# Patient Record
Sex: FEMALE | Race: ASIAN | NOT HISPANIC OR LATINO
[De-identification: names, ages, dates, MRNs, and addresses within clinical notes are randomized per-mention and may not be internally consistent; named-entity substitution may affect disease eponyms.]

---

## 2017-08-01 ENCOUNTER — NON-APPOINTMENT (OUTPATIENT)
Age: 67
End: 2017-08-01

## 2017-08-01 ENCOUNTER — APPOINTMENT (OUTPATIENT)
Dept: INTERNAL MEDICINE | Facility: CLINIC | Age: 67
End: 2017-08-01
Payer: MEDICARE

## 2017-08-01 VITALS
SYSTOLIC BLOOD PRESSURE: 146 MMHG | HEIGHT: 61 IN | OXYGEN SATURATION: 98 % | DIASTOLIC BLOOD PRESSURE: 80 MMHG | HEART RATE: 88 BPM | BODY MASS INDEX: 22.66 KG/M2 | WEIGHT: 120 LBS

## 2017-08-01 DIAGNOSIS — Z86.39 PERSONAL HISTORY OF OTHER ENDOCRINE, NUTRITIONAL AND METABOLIC DISEASE: ICD-10-CM

## 2017-08-01 LAB
BASOPHILS # BLD AUTO: 0.03 K/UL
BASOPHILS NFR BLD AUTO: 0.5 %
EOSINOPHIL # BLD AUTO: 0.07 K/UL
EOSINOPHIL NFR BLD AUTO: 1.1 %
HCT VFR BLD CALC: 42.8 %
HGB BLD-MCNC: 13.6 G/DL
IMM GRANULOCYTES NFR BLD AUTO: 0.2 %
LYMPHOCYTES # BLD AUTO: 1.11 K/UL
LYMPHOCYTES NFR BLD AUTO: 17.3 %
MAN DIFF?: NORMAL
MCHC RBC-ENTMCNC: 28.9 PG
MCHC RBC-ENTMCNC: 31.8 GM/DL
MCV RBC AUTO: 90.9 FL
MONOCYTES # BLD AUTO: 0.33 K/UL
MONOCYTES NFR BLD AUTO: 5.2 %
NEUTROPHILS # BLD AUTO: 4.85 K/UL
NEUTROPHILS NFR BLD AUTO: 75.7 %
PLATELET # BLD AUTO: 237 K/UL
RBC # BLD: 4.71 M/UL
RBC # FLD: 15.2 %
WBC # FLD AUTO: 6.4 K/UL

## 2017-08-01 PROCEDURE — 93000 ELECTROCARDIOGRAM COMPLETE: CPT

## 2017-08-01 PROCEDURE — 99397 PER PM REEVAL EST PAT 65+ YR: CPT | Mod: 25

## 2017-08-02 ENCOUNTER — RX RENEWAL (OUTPATIENT)
Age: 67
End: 2017-08-02

## 2017-08-02 VITALS — SYSTOLIC BLOOD PRESSURE: 140 MMHG | DIASTOLIC BLOOD PRESSURE: 80 MMHG | HEART RATE: 86 BPM

## 2017-08-08 ENCOUNTER — FORM ENCOUNTER (OUTPATIENT)
Age: 67
End: 2017-08-08

## 2017-08-08 LAB
25(OH)D3 SERPL-MCNC: 26.4 NG/ML
ALBUMIN SERPL ELPH-MCNC: 4.4 G/DL
ALP BLD-CCNC: 65 U/L
ALT SERPL-CCNC: 14 U/L
ANION GAP SERPL CALC-SCNC: 15 MMOL/L
AST SERPL-CCNC: 21 U/L
BILIRUB SERPL-MCNC: 0.5 MG/DL
BUN SERPL-MCNC: 16 MG/DL
CALCIUM SERPL-MCNC: 9.6 MG/DL
CHLORIDE SERPL-SCNC: 102 MMOL/L
CHOLEST SERPL-MCNC: 168 MG/DL
CHOLEST/HDLC SERPL: 2.9 RATIO
CO2 SERPL-SCNC: 24 MMOL/L
CREAT SERPL-MCNC: 0.75 MG/DL
GLUCOSE SERPL-MCNC: 98 MG/DL
HBA1C MFR BLD HPLC: 6 %
HDLC SERPL-MCNC: 57 MG/DL
LDLC SERPL CALC-MCNC: 89 MG/DL
POTASSIUM SERPL-SCNC: 4.2 MMOL/L
PROT SERPL-MCNC: 8.4 G/DL
SODIUM SERPL-SCNC: 141 MMOL/L
T4 FREE SERPL-MCNC: 1.1 NG/DL
TRIGL SERPL-MCNC: 109 MG/DL
TSH SERPL-ACNC: 5.05 UIU/ML

## 2017-08-09 ENCOUNTER — APPOINTMENT (OUTPATIENT)
Dept: MAMMOGRAPHY | Facility: CLINIC | Age: 67
End: 2017-08-09

## 2017-08-09 ENCOUNTER — OUTPATIENT (OUTPATIENT)
Dept: OUTPATIENT SERVICES | Facility: HOSPITAL | Age: 67
LOS: 1 days | End: 2017-08-09
Payer: MEDICARE

## 2017-08-09 DIAGNOSIS — Z00.8 ENCOUNTER FOR OTHER GENERAL EXAMINATION: ICD-10-CM

## 2017-08-09 PROCEDURE — 77067 SCR MAMMO BI INCL CAD: CPT

## 2017-08-09 PROCEDURE — 77063 BREAST TOMOSYNTHESIS BI: CPT

## 2017-08-09 PROCEDURE — 77063 BREAST TOMOSYNTHESIS BI: CPT | Mod: 26

## 2017-08-09 PROCEDURE — G0202: CPT | Mod: 26

## 2017-08-14 ENCOUNTER — APPOINTMENT (OUTPATIENT)
Dept: CARDIOLOGY | Facility: CLINIC | Age: 67
End: 2017-08-14
Payer: MEDICARE

## 2017-08-14 ENCOUNTER — NON-APPOINTMENT (OUTPATIENT)
Age: 67
End: 2017-08-14

## 2017-08-14 VITALS
BODY MASS INDEX: 21.9 KG/M2 | HEIGHT: 61 IN | WEIGHT: 116 LBS | HEART RATE: 86 BPM | DIASTOLIC BLOOD PRESSURE: 82 MMHG | SYSTOLIC BLOOD PRESSURE: 142 MMHG

## 2017-08-14 VITALS — DIASTOLIC BLOOD PRESSURE: 84 MMHG | SYSTOLIC BLOOD PRESSURE: 136 MMHG

## 2017-08-14 PROCEDURE — 99204 OFFICE O/P NEW MOD 45 MIN: CPT

## 2017-08-14 PROCEDURE — 93000 ELECTROCARDIOGRAM COMPLETE: CPT

## 2017-08-29 ENCOUNTER — APPOINTMENT (OUTPATIENT)
Dept: CARDIOLOGY | Facility: CLINIC | Age: 67
End: 2017-08-29
Payer: MEDICARE

## 2017-08-29 DIAGNOSIS — R94.31 ABNORMAL ELECTROCARDIOGRAM [ECG] [EKG]: ICD-10-CM

## 2017-08-29 DIAGNOSIS — R00.2 PALPITATIONS: ICD-10-CM

## 2017-08-29 PROCEDURE — 93015 CV STRESS TEST SUPVJ I&R: CPT

## 2017-08-29 PROCEDURE — 93306 TTE W/DOPPLER COMPLETE: CPT

## 2017-08-30 ENCOUNTER — FORM ENCOUNTER (OUTPATIENT)
Age: 67
End: 2017-08-30

## 2017-08-30 PROBLEM — R94.31 ABNORMAL ECG: Status: ACTIVE | Noted: 2017-08-16

## 2017-08-31 ENCOUNTER — APPOINTMENT (OUTPATIENT)
Dept: ULTRASOUND IMAGING | Facility: CLINIC | Age: 67
End: 2017-08-31
Payer: MEDICARE

## 2017-08-31 ENCOUNTER — OUTPATIENT (OUTPATIENT)
Dept: OUTPATIENT SERVICES | Facility: HOSPITAL | Age: 67
LOS: 1 days | End: 2017-08-31
Payer: MEDICARE

## 2017-08-31 DIAGNOSIS — Z00.8 ENCOUNTER FOR OTHER GENERAL EXAMINATION: ICD-10-CM

## 2017-08-31 PROCEDURE — 76642 ULTRASOUND BREAST LIMITED: CPT

## 2017-08-31 PROCEDURE — 76642 ULTRASOUND BREAST LIMITED: CPT | Mod: 26,RT

## 2017-11-01 ENCOUNTER — APPOINTMENT (OUTPATIENT)
Dept: INTERNAL MEDICINE | Facility: CLINIC | Age: 67
End: 2017-11-01
Payer: MEDICARE

## 2017-11-01 VITALS
HEIGHT: 61 IN | BODY MASS INDEX: 22.28 KG/M2 | HEART RATE: 104 BPM | DIASTOLIC BLOOD PRESSURE: 80 MMHG | SYSTOLIC BLOOD PRESSURE: 134 MMHG | WEIGHT: 118 LBS | OXYGEN SATURATION: 98 %

## 2017-11-01 PROCEDURE — 99213 OFFICE O/P EST LOW 20 MIN: CPT | Mod: 25

## 2017-11-01 PROCEDURE — 90662 IIV NO PRSV INCREASED AG IM: CPT

## 2017-11-01 PROCEDURE — G0008: CPT

## 2018-05-01 ENCOUNTER — FORM ENCOUNTER (OUTPATIENT)
Age: 68
End: 2018-05-01

## 2018-05-02 ENCOUNTER — APPOINTMENT (OUTPATIENT)
Dept: INTERNAL MEDICINE | Facility: CLINIC | Age: 68
End: 2018-05-02
Payer: MEDICARE

## 2018-05-02 ENCOUNTER — OUTPATIENT (OUTPATIENT)
Dept: OUTPATIENT SERVICES | Facility: HOSPITAL | Age: 68
LOS: 1 days | End: 2018-05-02
Payer: MEDICARE

## 2018-05-02 ENCOUNTER — APPOINTMENT (OUTPATIENT)
Dept: RADIOLOGY | Facility: CLINIC | Age: 68
End: 2018-05-02
Payer: MEDICARE

## 2018-05-02 VITALS
HEIGHT: 61 IN | DIASTOLIC BLOOD PRESSURE: 80 MMHG | SYSTOLIC BLOOD PRESSURE: 150 MMHG | BODY MASS INDEX: 22.66 KG/M2 | OXYGEN SATURATION: 96 % | WEIGHT: 120 LBS | HEART RATE: 92 BPM

## 2018-05-02 DIAGNOSIS — M54.5 LOW BACK PAIN: ICD-10-CM

## 2018-05-02 PROCEDURE — 72100 X-RAY EXAM L-S SPINE 2/3 VWS: CPT | Mod: 26

## 2018-05-02 PROCEDURE — 99213 OFFICE O/P EST LOW 20 MIN: CPT

## 2018-05-02 PROCEDURE — 72100 X-RAY EXAM L-S SPINE 2/3 VWS: CPT

## 2018-11-06 ENCOUNTER — APPOINTMENT (OUTPATIENT)
Dept: INTERNAL MEDICINE | Facility: CLINIC | Age: 68
End: 2018-11-06
Payer: MEDICARE

## 2018-11-06 VITALS
DIASTOLIC BLOOD PRESSURE: 80 MMHG | HEIGHT: 61 IN | OXYGEN SATURATION: 98 % | HEART RATE: 83 BPM | BODY MASS INDEX: 22.66 KG/M2 | WEIGHT: 120 LBS | SYSTOLIC BLOOD PRESSURE: 140 MMHG

## 2018-11-06 DIAGNOSIS — L30.9 DERMATITIS, UNSPECIFIED: ICD-10-CM

## 2018-11-06 DIAGNOSIS — N64.4 MASTODYNIA: ICD-10-CM

## 2018-11-06 PROCEDURE — 90662 IIV NO PRSV INCREASED AG IM: CPT

## 2018-11-06 PROCEDURE — G0439: CPT | Mod: 25

## 2018-11-06 PROCEDURE — G0008: CPT

## 2018-11-06 RX ORDER — ZOSTER VACCINE RECOMBINANT, ADJUVANTED 50 MCG/0.5
50 KIT INTRAMUSCULAR
Qty: 1 | Refills: 1 | Status: ACTIVE | COMMUNITY
Start: 2018-11-06 | End: 1900-01-01

## 2018-11-06 RX ORDER — ZOSTER VACCINE LIVE 19400 [PFU]/.65ML
19400 INJECTION, POWDER, LYOPHILIZED, FOR SUSPENSION SUBCUTANEOUS
Qty: 1 | Refills: 0 | Status: DISCONTINUED | COMMUNITY
Start: 2017-08-01 | End: 2018-11-06

## 2018-11-07 ENCOUNTER — TRANSCRIPTION ENCOUNTER (OUTPATIENT)
Age: 68
End: 2018-11-07

## 2018-11-07 NOTE — HEALTH RISK ASSESSMENT
[No falls in past year] : Patient reported no falls in the past year [Patient reported mammogram was normal] : Patient reported mammogram was normal [Patient reported PAP Smear was normal] : Patient reported PAP Smear was normal [Patient reported bone density results were normal] : Patient reported bone density results were normal [Patient reported colonoscopy was normal] : Patient reported colonoscopy was normal [HIV Test offered] : HIV Test offered [Hepatitis C test declined] : Hepatitis C test declined [None] : None [With Significant Other] : lives with significant other [Retired] : retired [] :  [# Of Children ___] : has [unfilled] children [Feels Safe at Home] : Feels safe at home [Fully functional (bathing, dressing, toileting, transferring, walking, feeding)] : Fully functional (bathing, dressing, toileting, transferring, walking, feeding) [Fully functional (using the telephone, shopping, preparing meals, housekeeping, doing laundry, using] : Fully functional and needs no help or supervision to perform IADLs (using the telephone, shopping, preparing meals, housekeeping, doing laundry, using transportation, managing medications and managing finances) [Smoke Detector] : smoke detector [Carbon Monoxide Detector] : carbon monoxide detector [Safety elements used in home] : safety elements used in home [Seat Belt] :  uses seat belt [Sunscreen] : uses sunscreen [Discussed at today's visit] : Advance Directives Discussed at today's visit [Name: ___] : Health Care Proxy's Name: [unfilled]  [Relationship: ___] : Relationship: [unfilled] [Very Good] : ~his/her~  mood as very good [] : No [0] : 2) Feeling down, depressed, or hopeless: Not at all (0) [Change in mental status noted] : No change in mental status noted [Language] : denies difficulty with language [Sexually Active] : not sexually active [High Risk Behavior] : no high risk behavior [Reports changes in hearing] : Reports no changes in hearing [Reports changes in vision] : Reports no changes in vision [Reports changes in dental health] : Reports no changes in dental health [Guns at Home] : no guns at home [Travel to Developing Areas] : does not  travel to developing areas [TB Exposure] : is not being exposed to tuberculosis [MammogramDate] : 8/17 [PapSmearDate] : 5/15 [BoneDensityDate] : 3/15 [ColonoscopyDate] : 3/16 [ColonoscopyComments] : 5 yrs [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

## 2018-11-07 NOTE — ASSESSMENT
[FreeTextEntry1] : Pt is doing well\par check labs\par Flu shot given\par Mammo/US and BMD due\par send in flonase and triamcin crm

## 2018-11-07 NOTE — HISTORY OF PRESENT ILLNESS
[FreeTextEntry1] : cpe [de-identified] : 66 yo woman for CPE\par HLD, preDM, hypothy s/p MARTELL no replacemt; eczema, low back pain,\par  o.penia, allergic rhitnits, breast pain s/p bx 20 yrs ago.\par \par Pt hasn't been taking her statin, wants to see lipids off them.\par c/o dry rash behind ears, lower arms. has been told eczmea by derm in past.\par

## 2018-11-12 LAB
ALBUMIN SERPL ELPH-MCNC: 4.6 G/DL
ALP BLD-CCNC: 81 U/L
ALT SERPL-CCNC: 16 U/L
ANION GAP SERPL CALC-SCNC: 13 MMOL/L
AST SERPL-CCNC: 19 U/L
BASOPHILS # BLD AUTO: 0.04 K/UL
BASOPHILS NFR BLD AUTO: 0.8 %
BILIRUB SERPL-MCNC: 0.4 MG/DL
BUN SERPL-MCNC: 16 MG/DL
CALCIUM SERPL-MCNC: 9.8 MG/DL
CHLORIDE SERPL-SCNC: 103 MMOL/L
CHOLEST SERPL-MCNC: 215 MG/DL
CHOLEST/HDLC SERPL: 4.1 RATIO
CO2 SERPL-SCNC: 26 MMOL/L
CREAT SERPL-MCNC: 0.88 MG/DL
EOSINOPHIL # BLD AUTO: 0.05 K/UL
EOSINOPHIL NFR BLD AUTO: 1 %
GLUCOSE SERPL-MCNC: 103 MG/DL
HBA1C MFR BLD HPLC: 6 %
HCT VFR BLD CALC: 44.7 %
HDLC SERPL-MCNC: 52 MG/DL
HGB BLD-MCNC: 14.2 G/DL
HIV1+2 AB SPEC QL IA.RAPID: NONREACTIVE
IMM GRANULOCYTES NFR BLD AUTO: 0.2 %
LDLC SERPL CALC-MCNC: 137 MG/DL
LYMPHOCYTES # BLD AUTO: 1.23 K/UL
LYMPHOCYTES NFR BLD AUTO: 23.5 %
MAN DIFF?: NORMAL
MCHC RBC-ENTMCNC: 30.1 PG
MCHC RBC-ENTMCNC: 31.8 GM/DL
MCV RBC AUTO: 94.9 FL
MONOCYTES # BLD AUTO: 0.28 K/UL
MONOCYTES NFR BLD AUTO: 5.4 %
NEUTROPHILS # BLD AUTO: 3.62 K/UL
NEUTROPHILS NFR BLD AUTO: 69.1 %
PLATELET # BLD AUTO: 279 K/UL
POTASSIUM SERPL-SCNC: 4.3 MMOL/L
PROT SERPL-MCNC: 8.1 G/DL
RBC # BLD: 4.71 M/UL
RBC # FLD: 14.9 %
SODIUM SERPL-SCNC: 142 MMOL/L
TRIGL SERPL-MCNC: 130 MG/DL
TSH SERPL-ACNC: 4.23 UIU/ML
WBC # FLD AUTO: 5.23 K/UL

## 2018-12-05 ENCOUNTER — FORM ENCOUNTER (OUTPATIENT)
Age: 68
End: 2018-12-05

## 2018-12-06 ENCOUNTER — APPOINTMENT (OUTPATIENT)
Dept: MAMMOGRAPHY | Facility: CLINIC | Age: 68
End: 2018-12-06
Payer: MEDICARE

## 2018-12-06 ENCOUNTER — APPOINTMENT (OUTPATIENT)
Dept: RADIOLOGY | Facility: CLINIC | Age: 68
End: 2018-12-06
Payer: MEDICARE

## 2018-12-06 ENCOUNTER — OUTPATIENT (OUTPATIENT)
Dept: OUTPATIENT SERVICES | Facility: HOSPITAL | Age: 68
LOS: 1 days | End: 2018-12-06
Payer: MEDICARE

## 2018-12-06 ENCOUNTER — RX RENEWAL (OUTPATIENT)
Age: 68
End: 2018-12-06

## 2018-12-06 ENCOUNTER — APPOINTMENT (OUTPATIENT)
Dept: ULTRASOUND IMAGING | Facility: CLINIC | Age: 68
End: 2018-12-06
Payer: MEDICARE

## 2018-12-06 DIAGNOSIS — Z00.8 ENCOUNTER FOR OTHER GENERAL EXAMINATION: ICD-10-CM

## 2018-12-06 DIAGNOSIS — Z00.00 ENCOUNTER FOR GENERAL ADULT MEDICAL EXAMINATION WITHOUT ABNORMAL FINDINGS: ICD-10-CM

## 2018-12-06 PROCEDURE — 77063 BREAST TOMOSYNTHESIS BI: CPT | Mod: 26

## 2018-12-06 PROCEDURE — 76641 ULTRASOUND BREAST COMPLETE: CPT | Mod: 26,50

## 2018-12-06 PROCEDURE — 77080 DXA BONE DENSITY AXIAL: CPT | Mod: 26

## 2018-12-06 PROCEDURE — 77067 SCR MAMMO BI INCL CAD: CPT

## 2018-12-06 PROCEDURE — 77063 BREAST TOMOSYNTHESIS BI: CPT

## 2018-12-06 PROCEDURE — 77067 SCR MAMMO BI INCL CAD: CPT | Mod: 26

## 2018-12-06 PROCEDURE — 77080 DXA BONE DENSITY AXIAL: CPT

## 2018-12-06 PROCEDURE — 76641 ULTRASOUND BREAST COMPLETE: CPT

## 2018-12-17 ENCOUNTER — FORM ENCOUNTER (OUTPATIENT)
Age: 68
End: 2018-12-17

## 2018-12-18 ENCOUNTER — APPOINTMENT (OUTPATIENT)
Dept: ULTRASOUND IMAGING | Facility: CLINIC | Age: 68
End: 2018-12-18
Payer: MEDICARE

## 2018-12-18 ENCOUNTER — OUTPATIENT (OUTPATIENT)
Dept: OUTPATIENT SERVICES | Facility: HOSPITAL | Age: 68
LOS: 1 days | End: 2018-12-18
Payer: MEDICARE

## 2018-12-18 DIAGNOSIS — Z00.8 ENCOUNTER FOR OTHER GENERAL EXAMINATION: ICD-10-CM

## 2018-12-18 PROCEDURE — 76642 ULTRASOUND BREAST LIMITED: CPT | Mod: 26,RT

## 2018-12-18 PROCEDURE — 76642 ULTRASOUND BREAST LIMITED: CPT

## 2018-12-25 ENCOUNTER — FORM ENCOUNTER (OUTPATIENT)
Age: 68
End: 2018-12-25

## 2018-12-26 ENCOUNTER — RESULT REVIEW (OUTPATIENT)
Age: 68
End: 2018-12-26

## 2018-12-26 ENCOUNTER — APPOINTMENT (OUTPATIENT)
Dept: ULTRASOUND IMAGING | Facility: CLINIC | Age: 68
End: 2018-12-26
Payer: MEDICARE

## 2018-12-26 ENCOUNTER — OUTPATIENT (OUTPATIENT)
Dept: OUTPATIENT SERVICES | Facility: HOSPITAL | Age: 68
LOS: 1 days | End: 2018-12-26
Payer: MEDICARE

## 2018-12-26 DIAGNOSIS — N60.01 SOLITARY CYST OF RIGHT BREAST: ICD-10-CM

## 2018-12-26 PROCEDURE — 77065 DX MAMMO INCL CAD UNI: CPT | Mod: 26,RT

## 2018-12-26 PROCEDURE — 76942 ECHO GUIDE FOR BIOPSY: CPT

## 2018-12-26 PROCEDURE — 77065 DX MAMMO INCL CAD UNI: CPT

## 2018-12-26 PROCEDURE — 19000 PUNCTURE ASPIR CYST BREAST: CPT | Mod: RT

## 2018-12-26 PROCEDURE — 19000 PUNCTURE ASPIR CYST BREAST: CPT

## 2018-12-26 PROCEDURE — 76942 ECHO GUIDE FOR BIOPSY: CPT | Mod: 26

## 2019-04-26 ENCOUNTER — MEDICATION RENEWAL (OUTPATIENT)
Age: 69
End: 2019-04-26

## 2019-04-30 ENCOUNTER — RX RENEWAL (OUTPATIENT)
Age: 69
End: 2019-04-30

## 2019-11-19 ENCOUNTER — APPOINTMENT (OUTPATIENT)
Dept: INTERNAL MEDICINE | Facility: CLINIC | Age: 69
End: 2019-11-19
Payer: MEDICARE

## 2019-11-19 VITALS
DIASTOLIC BLOOD PRESSURE: 82 MMHG | BODY MASS INDEX: 22.28 KG/M2 | SYSTOLIC BLOOD PRESSURE: 126 MMHG | HEART RATE: 80 BPM | OXYGEN SATURATION: 97 % | WEIGHT: 118 LBS | HEIGHT: 61 IN | TEMPERATURE: 98 F

## 2019-11-19 VITALS — DIASTOLIC BLOOD PRESSURE: 72 MMHG | SYSTOLIC BLOOD PRESSURE: 102 MMHG

## 2019-11-19 DIAGNOSIS — H61.23 IMPACTED CERUMEN, BILATERAL: ICD-10-CM

## 2019-11-19 DIAGNOSIS — J30.9 ALLERGIC RHINITIS, UNSPECIFIED: ICD-10-CM

## 2019-11-19 PROCEDURE — G0439: CPT

## 2019-11-19 NOTE — HISTORY OF PRESENT ILLNESS
[FreeTextEntry1] : cpe [de-identified] : 69 yo woman for annual visit\par \par HLD, taking Atorv 10 mg every other nite- she feels it causes back aches\par preDM, hypothy, asthma/allergies, eczema, LBP\par \par Doing well.\par Admits to not really exercising, willing to try.\par Last yr had cyst on Mammo/US, aspirated, benign. Has seen Dr. Boone and one other breast doctor over the yrs.\par no f/h Breast ca. except a cousin.\par Stomach ca. in father\par \par c/o occas sharp fleeting pain right knee, once a yr x many yrs-recently acted up after dancing/hi heels at [facility] wedding.\par c/o assymetry of her clavicles, no pain\par c/o post nasal drip s/p URI/cough recently ; has Flonase, hasn't tried it.\par \par some change in hearing, feels it's due to wax, has required removal\par \par Had 2 shingrix over summer\par had Flu shot at allergist  \par Optho uptodate

## 2019-11-19 NOTE — ASSESSMENT
[FreeTextEntry1] : Pt is doing well.\par Post nasal drip, allergies- can try Flonase.\par check routine labs\par Mammo/US\par renew Lipitor.\par f/u yrly if all ok.

## 2019-11-19 NOTE — HEALTH RISK ASSESSMENT
[Good] : ~his/her~  mood as  good [No] : No [No falls in past year] : Patient reported no falls in the past year [0] : 2) Feeling down, depressed, or hopeless: Not at all (0) [Patient reported mammogram was normal] : Patient reported mammogram was normal [Patient reported PAP Smear was normal] : Patient reported PAP Smear was normal [Patient reported bone density results were normal] : Patient reported bone density results were normal [Patient reported colonoscopy was normal] : Patient reported colonoscopy was normal [HIV test declined] : HIV test declined [Hepatitis C test declined] : Hepatitis C test declined [With Significant Other] : lives with significant other [] :  [# Of Children ___] : has [unfilled] children [Sexually Active] : sexually active [Feels Safe at Home] : Feels safe at home [Fully functional (bathing, dressing, toileting, transferring, walking, feeding)] : Fully functional (bathing, dressing, toileting, transferring, walking, feeding) [Fully functional (using the telephone, shopping, preparing meals, housekeeping, doing laundry, using] : Fully functional and needs no help or supervision to perform IADLs (using the telephone, shopping, preparing meals, housekeeping, doing laundry, using transportation, managing medications and managing finances) [Smoke Detector] : smoke detector [Carbon Monoxide Detector] : carbon monoxide detector [Safety elements used in home] : safety elements used in home [Seat Belt] :  uses seat belt [Sunscreen] : uses sunscreen [With Patient/Caregiver] : With Patient/Caregiver [Name: ___] : Health Care Proxy's Name: [unfilled]  [Relationship: ___] : Relationship: [unfilled] [Retired] : retired [None] : None [de-identified] : yes but not often will try more [] : No [High Risk Behavior] : no high risk behavior [Reports changes in hearing] : Reports no changes in hearing [Reports changes in vision] : Reports no changes in vision [Travel to Developing Areas] : does not  travel to developing areas [Reports changes in dental health] : Reports no changes in dental health [PapSmearDate] : 12/15 [MammogramDate] : 12/18 [BoneDensityDate] : 12/18 [ColonoscopyDate] : 03/16 [de-identified] : , no pets [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

## 2019-11-21 ENCOUNTER — TRANSCRIPTION ENCOUNTER (OUTPATIENT)
Age: 69
End: 2019-11-21

## 2019-11-26 LAB
25(OH)D3 SERPL-MCNC: 18.1 NG/ML
ALBUMIN SERPL ELPH-MCNC: 4.9 G/DL
ALP BLD-CCNC: 79 U/L
ALT SERPL-CCNC: 23 U/L
ANION GAP SERPL CALC-SCNC: 13 MMOL/L
AST SERPL-CCNC: 19 U/L
BASOPHILS # BLD AUTO: 0.05 K/UL
BASOPHILS NFR BLD AUTO: 0.9 %
BILIRUB SERPL-MCNC: 0.5 MG/DL
BUN SERPL-MCNC: 13 MG/DL
CALCIUM SERPL-MCNC: 10.1 MG/DL
CHLORIDE SERPL-SCNC: 104 MMOL/L
CHOLEST SERPL-MCNC: 168 MG/DL
CHOLEST/HDLC SERPL: 3 RATIO
CO2 SERPL-SCNC: 25 MMOL/L
CREAT SERPL-MCNC: 0.69 MG/DL
EOSINOPHIL # BLD AUTO: 0.05 K/UL
EOSINOPHIL NFR BLD AUTO: 0.9 %
ESTIMATED AVERAGE GLUCOSE: 128 MG/DL
GLUCOSE SERPL-MCNC: 96 MG/DL
HBA1C MFR BLD HPLC: 6.1 %
HCT VFR BLD CALC: 43.6 %
HDLC SERPL-MCNC: 56 MG/DL
HGB BLD-MCNC: 14.1 G/DL
IMM GRANULOCYTES NFR BLD AUTO: 0.2 %
LDLC SERPL CALC-MCNC: 84 MG/DL
LYMPHOCYTES # BLD AUTO: 1.4 K/UL
LYMPHOCYTES NFR BLD AUTO: 25.8 %
MAN DIFF?: NORMAL
MCHC RBC-ENTMCNC: 30.1 PG
MCHC RBC-ENTMCNC: 32.3 GM/DL
MCV RBC AUTO: 93.2 FL
MONOCYTES # BLD AUTO: 0.38 K/UL
MONOCYTES NFR BLD AUTO: 7 %
NEUTROPHILS # BLD AUTO: 3.53 K/UL
NEUTROPHILS NFR BLD AUTO: 65.2 %
PLATELET # BLD AUTO: 265 K/UL
POTASSIUM SERPL-SCNC: 4.2 MMOL/L
PROT SERPL-MCNC: 7.7 G/DL
RBC # BLD: 4.68 M/UL
RBC # FLD: 14.5 %
SODIUM SERPL-SCNC: 142 MMOL/L
TRIGL SERPL-MCNC: 142 MG/DL
TSH SERPL-ACNC: 4.8 UIU/ML
WBC # FLD AUTO: 5.42 K/UL

## 2020-01-09 ENCOUNTER — FORM ENCOUNTER (OUTPATIENT)
Age: 70
End: 2020-01-09

## 2020-01-10 ENCOUNTER — OUTPATIENT (OUTPATIENT)
Dept: OUTPATIENT SERVICES | Facility: HOSPITAL | Age: 70
LOS: 1 days | End: 2020-01-10
Payer: MEDICARE

## 2020-01-10 ENCOUNTER — APPOINTMENT (OUTPATIENT)
Dept: ULTRASOUND IMAGING | Facility: CLINIC | Age: 70
End: 2020-01-10
Payer: MEDICARE

## 2020-01-10 ENCOUNTER — APPOINTMENT (OUTPATIENT)
Dept: MAMMOGRAPHY | Facility: CLINIC | Age: 70
End: 2020-01-10
Payer: MEDICARE

## 2020-01-10 DIAGNOSIS — Z00.8 ENCOUNTER FOR OTHER GENERAL EXAMINATION: ICD-10-CM

## 2020-01-10 PROCEDURE — 76641 ULTRASOUND BREAST COMPLETE: CPT | Mod: 26,50

## 2020-01-10 PROCEDURE — 77067 SCR MAMMO BI INCL CAD: CPT | Mod: 26

## 2020-01-10 PROCEDURE — 77063 BREAST TOMOSYNTHESIS BI: CPT

## 2020-01-10 PROCEDURE — 77063 BREAST TOMOSYNTHESIS BI: CPT | Mod: 26

## 2020-01-10 PROCEDURE — 77067 SCR MAMMO BI INCL CAD: CPT

## 2020-01-10 PROCEDURE — 76641 ULTRASOUND BREAST COMPLETE: CPT

## 2020-04-14 ENCOUNTER — RX RENEWAL (OUTPATIENT)
Age: 70
End: 2020-04-14

## 2020-12-14 ENCOUNTER — NON-APPOINTMENT (OUTPATIENT)
Age: 70
End: 2020-12-14

## 2021-06-15 ENCOUNTER — NON-APPOINTMENT (OUTPATIENT)
Age: 71
End: 2021-06-15

## 2021-06-21 ENCOUNTER — APPOINTMENT (OUTPATIENT)
Dept: INTERNAL MEDICINE | Facility: CLINIC | Age: 71
End: 2021-06-21
Payer: MEDICARE

## 2021-06-21 VITALS
TEMPERATURE: 97.3 F | HEART RATE: 81 BPM | SYSTOLIC BLOOD PRESSURE: 131 MMHG | BODY MASS INDEX: 23.41 KG/M2 | WEIGHT: 124 LBS | DIASTOLIC BLOOD PRESSURE: 76 MMHG | HEIGHT: 61 IN | OXYGEN SATURATION: 94 %

## 2021-06-21 DIAGNOSIS — R92.8 OTHER ABNORMAL AND INCONCLUSIVE FINDINGS ON DIAGNOSTIC IMAGING OF BREAST: ICD-10-CM

## 2021-06-21 PROCEDURE — 99072 ADDL SUPL MATRL&STAF TM PHE: CPT

## 2021-06-21 PROCEDURE — G0439: CPT

## 2021-06-21 RX ORDER — HYDROCORTISONE 25 MG/G
2.5 CREAM TOPICAL
Qty: 28.35 | Refills: 3 | Status: DISCONTINUED | COMMUNITY
Start: 2017-11-01 | End: 2021-06-21

## 2021-06-22 LAB
25(OH)D3 SERPL-MCNC: 26.3 NG/ML
ALBUMIN SERPL ELPH-MCNC: 4.7 G/DL
ALP BLD-CCNC: 84 U/L
ALT SERPL-CCNC: 22 U/L
ANION GAP SERPL CALC-SCNC: 14 MMOL/L
AST SERPL-CCNC: 21 U/L
BASOPHILS # BLD AUTO: 0.06 K/UL
BASOPHILS NFR BLD AUTO: 1 %
BILIRUB SERPL-MCNC: 0.5 MG/DL
BUN SERPL-MCNC: 17 MG/DL
CALCIUM SERPL-MCNC: 10.1 MG/DL
CHLORIDE SERPL-SCNC: 103 MMOL/L
CHOLEST SERPL-MCNC: 198 MG/DL
CO2 SERPL-SCNC: 24 MMOL/L
CREAT SERPL-MCNC: 0.68 MG/DL
EOSINOPHIL # BLD AUTO: 0.05 K/UL
EOSINOPHIL NFR BLD AUTO: 0.8 %
ESTIMATED AVERAGE GLUCOSE: 128 MG/DL
GLUCOSE SERPL-MCNC: 107 MG/DL
HBA1C MFR BLD HPLC: 6.1 %
HCT VFR BLD CALC: 45.3 %
HDLC SERPL-MCNC: 55 MG/DL
HGB BLD-MCNC: 14.2 G/DL
IMM GRANULOCYTES NFR BLD AUTO: 0.3 %
LDLC SERPL CALC-MCNC: 117 MG/DL
LYMPHOCYTES # BLD AUTO: 1.13 K/UL
LYMPHOCYTES NFR BLD AUTO: 18.6 %
MAN DIFF?: NORMAL
MCHC RBC-ENTMCNC: 30 PG
MCHC RBC-ENTMCNC: 31.3 GM/DL
MCV RBC AUTO: 95.8 FL
MONOCYTES # BLD AUTO: 0.37 K/UL
MONOCYTES NFR BLD AUTO: 6.1 %
NEUTROPHILS # BLD AUTO: 4.46 K/UL
NEUTROPHILS NFR BLD AUTO: 73.2 %
NONHDLC SERPL-MCNC: 143 MG/DL
PLATELET # BLD AUTO: 259 K/UL
POTASSIUM SERPL-SCNC: 4.1 MMOL/L
PROT SERPL-MCNC: 7.7 G/DL
RBC # BLD: 4.73 M/UL
RBC # FLD: 15.3 %
SODIUM SERPL-SCNC: 140 MMOL/L
T4 FREE SERPL-MCNC: 1.1 NG/DL
TRIGL SERPL-MCNC: 130 MG/DL
TSH SERPL-ACNC: 4.77 UIU/ML
WBC # FLD AUTO: 6.09 K/UL

## 2021-06-22 NOTE — HEALTH RISK ASSESSMENT
[Good] : ~his/her~  mood as  good [No] : No [No falls in past year] : Patient reported no falls in the past year [Patient reported mammogram was normal] : Patient reported mammogram was normal [Patient reported colonoscopy was normal] : Patient reported colonoscopy was normal [] : No [de-identified] : walks [de-identified] : eating healthy, not vegetarian [Change in mental status noted] : No change in mental status noted [With Significant Other] : lives with significant other [Unemployed] : unemployed [Retired] : retired [High Risk Behavior] : no high risk behavior [Feels Safe at Home] : Feels safe at home [Fully functional (bathing, dressing, toileting, transferring, walking, feeding)] : Fully functional (bathing, dressing, toileting, transferring, walking, feeding) [Fully functional (using the telephone, shopping, preparing meals, housekeeping, doing laundry, using] : Fully functional and needs no help or supervision to perform IADLs (using the telephone, shopping, preparing meals, housekeeping, doing laundry, using transportation, managing medications and managing finances) [Reports changes in hearing] : Reports no changes in hearing [Reports changes in dental health] : Reports no changes in dental health [Reports changes in vision] : Reports no changes in vision [Smoke Detector] : smoke detector [Seat Belt] :  uses seat belt [MammogramDate] : 01/20 [BoneDensityDate] : 12/18 [ColonoscopyDate] : 2016 [Patient/Caregiver unclear of wishes] : Patient/Caregiver unclear of wishes

## 2021-06-22 NOTE — HISTORY OF PRESENT ILLNESS
[FreeTextEntry1] : cpe [de-identified] : 71 yo woman w HLD,  fibrocystic breasts s/p biospy; vit D defic, osteopenia\par f/h Gastric ca. in father\par med: Atorv 10 mg\par BMD 12/18 due\par Mammo/US due\par [place] '16 ; FIT neg 11/20\par Optho Dr. BENTLEY\par Derm upcoming Dr. Lopez, growth on back\par Covid vax x 2 pfdizer 2/24, 3/20

## 2021-06-22 NOTE — PHYSICAL EXAM
[No Acute Distress] : no acute distress [Well Nourished] : well nourished [Well Developed] : well developed [Well-Appearing] : well-appearing [Normal Sclera/Conjunctiva] : normal sclera/conjunctiva [PERRL] : pupils equal round and reactive to light [EOMI] : extraocular movements intact [Normal Outer Ear/Nose] : the outer ears and nose were normal in appearance [Normal Oropharynx] : the oropharynx was normal [No JVD] : no jugular venous distention [No Lymphadenopathy] : no lymphadenopathy [Supple] : supple [Thyroid Normal, No Nodules] : the thyroid was normal and there were no nodules present [No Respiratory Distress] : no respiratory distress  [No Accessory Muscle Use] : no accessory muscle use [Clear to Auscultation] : lungs were clear to auscultation bilaterally [Normal Rate] : normal rate  [Regular Rhythm] : with a regular rhythm [Normal S1, S2] : normal S1 and S2 [No Murmur] : no murmur heard [No Carotid Bruits] : no carotid bruits [No Abdominal Bruit] : a ~M bruit was not heard ~T in the abdomen [No Varicosities] : no varicosities [No Edema] : there was no peripheral edema [Pedal Pulses Present] : the pedal pulses are present [No Palpable Aorta] : no palpable aorta [No Extremity Clubbing/Cyanosis] : no extremity clubbing/cyanosis [Normal Appearance] : normal in appearance [No Axillary Lymphadenopathy] : no axillary lymphadenopathy [Soft] : abdomen soft [Non Tender] : non-tender [Non-distended] : non-distended [No Masses] : no abdominal mass palpated [No HSM] : no HSM [Normal Bowel Sounds] : normal bowel sounds [Normal Posterior Cervical Nodes] : no posterior cervical lymphadenopathy [Normal Anterior Cervical Nodes] : no anterior cervical lymphadenopathy [No CVA Tenderness] : no CVA  tenderness [No Spinal Tenderness] : no spinal tenderness [No Joint Swelling] : no joint swelling [Grossly Normal Strength/Tone] : grossly normal strength/tone [No Rash] : no rash [Coordination Grossly Intact] : coordination grossly intact [No Focal Deficits] : no focal deficits [Normal Gait] : normal gait [Deep Tendon Reflexes (DTR)] : deep tendon reflexes were 2+ and symmetric [Normal Affect] : the affect was normal [Normal Insight/Judgement] : insight and judgment were intact

## 2021-06-25 ENCOUNTER — APPOINTMENT (OUTPATIENT)
Dept: DERMATOLOGY | Facility: CLINIC | Age: 71
End: 2021-06-25
Payer: MEDICARE

## 2021-06-25 ENCOUNTER — NON-APPOINTMENT (OUTPATIENT)
Age: 71
End: 2021-06-25

## 2021-06-25 DIAGNOSIS — L72.0 EPIDERMAL CYST: ICD-10-CM

## 2021-06-25 DIAGNOSIS — D23.9 OTHER BENIGN NEOPLASM OF SKIN, UNSPECIFIED: ICD-10-CM

## 2021-06-25 DIAGNOSIS — Z12.83 ENCOUNTER FOR SCREENING FOR MALIGNANT NEOPLASM OF SKIN: ICD-10-CM

## 2021-06-25 DIAGNOSIS — L82.0 INFLAMED SEBORRHEIC KERATOSIS: ICD-10-CM

## 2021-06-25 PROCEDURE — 99204 OFFICE O/P NEW MOD 45 MIN: CPT | Mod: 25

## 2021-06-25 PROCEDURE — 17110 DESTRUCTION B9 LES UP TO 14: CPT

## 2021-06-25 PROCEDURE — 99072 ADDL SUPL MATRL&STAF TM PHE: CPT

## 2021-07-29 ENCOUNTER — APPOINTMENT (OUTPATIENT)
Dept: ULTRASOUND IMAGING | Facility: CLINIC | Age: 71
End: 2021-07-29
Payer: MEDICARE

## 2021-07-29 ENCOUNTER — RESULT REVIEW (OUTPATIENT)
Age: 71
End: 2021-07-29

## 2021-07-29 ENCOUNTER — APPOINTMENT (OUTPATIENT)
Dept: MAMMOGRAPHY | Facility: CLINIC | Age: 71
End: 2021-07-29
Payer: MEDICARE

## 2021-07-29 ENCOUNTER — OUTPATIENT (OUTPATIENT)
Dept: OUTPATIENT SERVICES | Facility: HOSPITAL | Age: 71
LOS: 1 days | End: 2021-07-29
Payer: MEDICARE

## 2021-07-29 DIAGNOSIS — Z00.8 ENCOUNTER FOR OTHER GENERAL EXAMINATION: ICD-10-CM

## 2021-07-29 PROCEDURE — 77063 BREAST TOMOSYNTHESIS BI: CPT

## 2021-07-29 PROCEDURE — 76641 ULTRASOUND BREAST COMPLETE: CPT

## 2021-07-29 PROCEDURE — 77063 BREAST TOMOSYNTHESIS BI: CPT | Mod: 26

## 2021-07-29 PROCEDURE — 77067 SCR MAMMO BI INCL CAD: CPT | Mod: 26

## 2021-07-29 PROCEDURE — 76641 ULTRASOUND BREAST COMPLETE: CPT | Mod: 26,50

## 2021-07-29 PROCEDURE — 77067 SCR MAMMO BI INCL CAD: CPT

## 2021-09-29 ENCOUNTER — APPOINTMENT (OUTPATIENT)
Dept: DERMATOLOGY | Facility: CLINIC | Age: 71
End: 2021-09-29

## 2022-04-22 ENCOUNTER — TRANSCRIPTION ENCOUNTER (OUTPATIENT)
Age: 72
End: 2022-04-22

## 2022-07-12 ENCOUNTER — APPOINTMENT (OUTPATIENT)
Dept: INTERNAL MEDICINE | Facility: CLINIC | Age: 72
End: 2022-07-12

## 2022-07-12 VITALS
TEMPERATURE: 97.7 F | WEIGHT: 125 LBS | DIASTOLIC BLOOD PRESSURE: 87 MMHG | BODY MASS INDEX: 23.6 KG/M2 | HEIGHT: 61 IN | OXYGEN SATURATION: 96 % | SYSTOLIC BLOOD PRESSURE: 134 MMHG | HEART RATE: 88 BPM

## 2022-07-12 VITALS — DIASTOLIC BLOOD PRESSURE: 82 MMHG | SYSTOLIC BLOOD PRESSURE: 136 MMHG

## 2022-07-12 PROCEDURE — G0439: CPT

## 2022-07-12 NOTE — HEALTH RISK ASSESSMENT
[Good] : ~his/her~  mood as  good [Never] : Never [No] : No [0] : 2) Feeling down, depressed, or hopeless: Not at all (0) [Patient reported mammogram was normal] : Patient reported mammogram was normal [Patient reported bone density results were abnormal] : Patient reported bone density results were abnormal [Patient reported colonoscopy was normal] : Patient reported colonoscopy was normal [HIV test declined] : HIV test declined [Hepatitis C test declined] : Hepatitis C test declined [None] : None [With Significant Other] : lives with significant other [Retired] : retired [] :  [# Of Children ___] : has [unfilled] children [Sexually Active] : sexually active [Feels Safe at Home] : Feels safe at home [Fully functional (bathing, dressing, toileting, transferring, walking, feeding)] : Fully functional (bathing, dressing, toileting, transferring, walking, feeding) [Fully functional (using the telephone, shopping, preparing meals, housekeeping, doing laundry, using] : Fully functional and needs no help or supervision to perform IADLs (using the telephone, shopping, preparing meals, housekeeping, doing laundry, using transportation, managing medications and managing finances) [Smoke Detector] : smoke detector [Carbon Monoxide Detector] : carbon monoxide detector [Safety elements used in home] : safety elements used in home [Seat Belt] :  uses seat belt [Name: ___] : Health Care Proxy's Name: [unfilled]  [Relationship: ___] : Relationship: [unfilled] [de-identified] : not much, will work on this [de-identified] : healthy [Change in mental status noted] : No change in mental status noted [Language] : denies difficulty with language [High Risk Behavior] : no high risk behavior [Reports changes in hearing] : Reports no changes in hearing [Reports changes in vision] : Reports no changes in vision [Reports changes in dental health] : Reports no changes in dental health [MammogramDate] : 07/21 [BoneDensityDate] : 7/18 [ColonoscopyDate] : 3/16 [de-identified] : no pets [FreeTextEntry2] : retired [employer] [de-identified] : sees Joe, had laser for narrow angle glaucoma [AdvancecareDate] : 07/12/22 [FreeTextEntry4] : no heroics unless hope of recovery

## 2022-07-12 NOTE — HISTORY OF PRESENT ILLNESS
[FreeTextEntry1] : CPE [de-identified] : 70 yo woman w HLD, fibrocyst breasts/biops, low D, o,penia, allergic rhinitis\par Doing ok.\par Covid vax x 4 -3 Pfizer then moderna\par Shingrix in '18 x2\par Mammo due Dexa due\par [place] 3/16, will do FIT\par Home BPs usually ~ 120s/70, occas up to 140 then comes back down. Some salt in diet.\par

## 2022-07-12 NOTE — ASSESSMENT
[FreeTextEntry1] : Pt is doing well.\par Mammo/Dexa\par labs today\par renew Atorv\par Gyn- names\par FIT testing

## 2022-07-13 LAB
25(OH)D3 SERPL-MCNC: 32.8 NG/ML
ALBUMIN SERPL ELPH-MCNC: 4.9 G/DL
ALP BLD-CCNC: 90 U/L
ALT SERPL-CCNC: 26 U/L
ANION GAP SERPL CALC-SCNC: 15 MMOL/L
AST SERPL-CCNC: 22 U/L
BASOPHILS # BLD AUTO: 0.07 K/UL
BASOPHILS NFR BLD AUTO: 1.1 %
BILIRUB SERPL-MCNC: 0.5 MG/DL
BUN SERPL-MCNC: 16 MG/DL
CALCIUM SERPL-MCNC: 10 MG/DL
CHLORIDE SERPL-SCNC: 103 MMOL/L
CHOLEST SERPL-MCNC: 202 MG/DL
CO2 SERPL-SCNC: 24 MMOL/L
CREAT SERPL-MCNC: 0.7 MG/DL
EGFR: 91 ML/MIN/1.73M2
EOSINOPHIL # BLD AUTO: 0.06 K/UL
EOSINOPHIL NFR BLD AUTO: 1 %
ESTIMATED AVERAGE GLUCOSE: 128 MG/DL
GLUCOSE SERPL-MCNC: 100 MG/DL
HBA1C MFR BLD HPLC: 6.1 %
HCT VFR BLD CALC: 43.6 %
HDLC SERPL-MCNC: 57 MG/DL
HGB BLD-MCNC: 14.5 G/DL
IMM GRANULOCYTES NFR BLD AUTO: 0.5 %
LDLC SERPL CALC-MCNC: 105 MG/DL
LYMPHOCYTES # BLD AUTO: 1.52 K/UL
LYMPHOCYTES NFR BLD AUTO: 24.9 %
MAN DIFF?: NORMAL
MCHC RBC-ENTMCNC: 30.4 PG
MCHC RBC-ENTMCNC: 33.3 GM/DL
MCV RBC AUTO: 91.4 FL
MONOCYTES # BLD AUTO: 0.43 K/UL
MONOCYTES NFR BLD AUTO: 7 %
NEUTROPHILS # BLD AUTO: 3.99 K/UL
NEUTROPHILS NFR BLD AUTO: 65.5 %
NONHDLC SERPL-MCNC: 145 MG/DL
PLATELET # BLD AUTO: 250 K/UL
POTASSIUM SERPL-SCNC: 4.3 MMOL/L
PROT SERPL-MCNC: 7.8 G/DL
RBC # BLD: 4.77 M/UL
RBC # FLD: 14.2 %
SODIUM SERPL-SCNC: 142 MMOL/L
TRIGL SERPL-MCNC: 200 MG/DL
TSH SERPL-ACNC: 5.62 UIU/ML
WBC # FLD AUTO: 6.1 K/UL

## 2022-07-20 ENCOUNTER — LABORATORY RESULT (OUTPATIENT)
Age: 72
End: 2022-07-20

## 2022-08-05 ENCOUNTER — APPOINTMENT (OUTPATIENT)
Dept: RADIOLOGY | Facility: CLINIC | Age: 72
End: 2022-08-05

## 2022-08-05 ENCOUNTER — APPOINTMENT (OUTPATIENT)
Dept: ULTRASOUND IMAGING | Facility: CLINIC | Age: 72
End: 2022-08-05

## 2022-08-05 ENCOUNTER — APPOINTMENT (OUTPATIENT)
Dept: MAMMOGRAPHY | Facility: CLINIC | Age: 72
End: 2022-08-05

## 2022-08-30 ENCOUNTER — RESULT REVIEW (OUTPATIENT)
Age: 72
End: 2022-08-30

## 2022-08-30 ENCOUNTER — APPOINTMENT (OUTPATIENT)
Dept: ULTRASOUND IMAGING | Facility: CLINIC | Age: 72
End: 2022-08-30

## 2022-08-30 ENCOUNTER — OUTPATIENT (OUTPATIENT)
Dept: OUTPATIENT SERVICES | Facility: HOSPITAL | Age: 72
LOS: 1 days | End: 2022-08-30
Payer: MEDICARE

## 2022-08-30 ENCOUNTER — APPOINTMENT (OUTPATIENT)
Dept: RADIOLOGY | Facility: CLINIC | Age: 72
End: 2022-08-30

## 2022-08-30 ENCOUNTER — APPOINTMENT (OUTPATIENT)
Dept: MAMMOGRAPHY | Facility: CLINIC | Age: 72
End: 2022-08-30

## 2022-08-30 DIAGNOSIS — N60.19 DIFFUSE CYSTIC MASTOPATHY OF UNSPECIFIED BREAST: ICD-10-CM

## 2022-08-30 DIAGNOSIS — M85.80 OTHER SPECIFIED DISORDERS OF BONE DENSITY AND STRUCTURE, UNSPECIFIED SITE: ICD-10-CM

## 2022-08-30 DIAGNOSIS — Z00.8 ENCOUNTER FOR OTHER GENERAL EXAMINATION: ICD-10-CM

## 2022-08-30 DIAGNOSIS — Z00.00 ENCOUNTER FOR GENERAL ADULT MEDICAL EXAMINATION WITHOUT ABNORMAL FINDINGS: ICD-10-CM

## 2022-08-30 PROCEDURE — 77063 BREAST TOMOSYNTHESIS BI: CPT | Mod: 26

## 2022-08-30 PROCEDURE — 77067 SCR MAMMO BI INCL CAD: CPT | Mod: 26

## 2022-08-30 PROCEDURE — 77067 SCR MAMMO BI INCL CAD: CPT

## 2022-08-30 PROCEDURE — 77063 BREAST TOMOSYNTHESIS BI: CPT

## 2022-08-30 PROCEDURE — 76641 ULTRASOUND BREAST COMPLETE: CPT | Mod: 26,50

## 2022-08-30 PROCEDURE — 76641 ULTRASOUND BREAST COMPLETE: CPT

## 2022-08-30 PROCEDURE — 77080 DXA BONE DENSITY AXIAL: CPT

## 2022-08-30 PROCEDURE — 77080 DXA BONE DENSITY AXIAL: CPT | Mod: 26

## 2023-01-24 ENCOUNTER — NON-APPOINTMENT (OUTPATIENT)
Age: 73
End: 2023-01-24

## 2023-01-25 ENCOUNTER — APPOINTMENT (OUTPATIENT)
Dept: INTERNAL MEDICINE | Facility: CLINIC | Age: 73
End: 2023-01-25
Payer: MEDICARE

## 2023-01-25 VITALS
TEMPERATURE: 98.2 F | DIASTOLIC BLOOD PRESSURE: 75 MMHG | BODY MASS INDEX: 23.79 KG/M2 | HEIGHT: 61 IN | WEIGHT: 126 LBS | HEART RATE: 99 BPM | SYSTOLIC BLOOD PRESSURE: 139 MMHG | OXYGEN SATURATION: 97 %

## 2023-01-25 DIAGNOSIS — D23.5 OTHER BENIGN NEOPLASM OF SKIN OF TRUNK: ICD-10-CM

## 2023-01-25 PROCEDURE — 99213 OFFICE O/P EST LOW 20 MIN: CPT

## 2023-01-25 NOTE — PHYSICAL EXAM
[No Acute Distress] : no acute distress [No Respiratory Distress] : no respiratory distress  [Normal Rate] : normal rate  [de-identified] : Answering questions appropriately. Gets on exam table and ambulates without assistance [de-identified] : 4mm soft blueish papule with large pore/scab located posterolateral to the right labia majora. No tenderness or erythema

## 2023-01-25 NOTE — HISTORY OF PRESENT ILLNESS
[FreeTextEntry8] : Natasha Freeman is a 73yo F with PMhx of preDm, HLD who presents for a cyst.\par \par Noticed a lump in groin almost 1 year ago. Intermittently painful but only mildly so. Looks like a pimple. Same size since onset. No vaginal discharge, redness.

## 2023-02-14 ENCOUNTER — APPOINTMENT (OUTPATIENT)
Dept: INTERNAL MEDICINE | Facility: CLINIC | Age: 73
End: 2023-02-14

## 2023-04-05 ENCOUNTER — NON-APPOINTMENT (OUTPATIENT)
Age: 73
End: 2023-04-05

## 2023-04-05 ENCOUNTER — EMERGENCY (EMERGENCY)
Facility: HOSPITAL | Age: 73
LOS: 1 days | Discharge: HOME | End: 2023-04-05
Attending: EMERGENCY MEDICINE
Payer: MEDICARE

## 2023-04-05 VITALS
HEART RATE: 90 BPM | OXYGEN SATURATION: 98 % | RESPIRATION RATE: 18 BRPM | DIASTOLIC BLOOD PRESSURE: 90 MMHG | TEMPERATURE: 98 F | SYSTOLIC BLOOD PRESSURE: 153 MMHG

## 2023-04-05 VITALS
OXYGEN SATURATION: 96 % | SYSTOLIC BLOOD PRESSURE: 163 MMHG | DIASTOLIC BLOOD PRESSURE: 95 MMHG | HEART RATE: 99 BPM | RESPIRATION RATE: 20 BRPM | HEIGHT: 61 IN | TEMPERATURE: 98 F | WEIGHT: 125 LBS

## 2023-04-05 LAB
ALBUMIN SERPL ELPH-MCNC: 4.6 G/DL — SIGNIFICANT CHANGE UP (ref 3.3–5)
ALP SERPL-CCNC: 87 U/L — SIGNIFICANT CHANGE UP (ref 40–120)
ALT FLD-CCNC: 29 U/L — SIGNIFICANT CHANGE UP (ref 10–45)
ANION GAP SERPL CALC-SCNC: 12 MMOL/L — SIGNIFICANT CHANGE UP (ref 5–17)
AST SERPL-CCNC: 21 U/L — SIGNIFICANT CHANGE UP (ref 10–40)
BASOPHILS # BLD AUTO: 0.04 K/UL — SIGNIFICANT CHANGE UP (ref 0–0.2)
BASOPHILS NFR BLD AUTO: 0.7 % — SIGNIFICANT CHANGE UP (ref 0–2)
BILIRUB SERPL-MCNC: 0.4 MG/DL — SIGNIFICANT CHANGE UP (ref 0.2–1.2)
BUN SERPL-MCNC: 11 MG/DL — SIGNIFICANT CHANGE UP (ref 7–23)
CALCIUM SERPL-MCNC: 9.4 MG/DL — SIGNIFICANT CHANGE UP (ref 8.4–10.5)
CHLORIDE SERPL-SCNC: 105 MMOL/L — SIGNIFICANT CHANGE UP (ref 96–108)
CO2 SERPL-SCNC: 26 MMOL/L — SIGNIFICANT CHANGE UP (ref 22–31)
CREAT SERPL-MCNC: 0.63 MG/DL — SIGNIFICANT CHANGE UP (ref 0.5–1.3)
EGFR: 93 ML/MIN/1.73M2 — SIGNIFICANT CHANGE UP
EOSINOPHIL # BLD AUTO: 0.02 K/UL — SIGNIFICANT CHANGE UP (ref 0–0.5)
EOSINOPHIL NFR BLD AUTO: 0.3 % — SIGNIFICANT CHANGE UP (ref 0–6)
FLUAV AG NPH QL: SIGNIFICANT CHANGE UP
FLUBV AG NPH QL: SIGNIFICANT CHANGE UP
GAS PNL BLDV: SIGNIFICANT CHANGE UP
GLUCOSE SERPL-MCNC: 94 MG/DL — SIGNIFICANT CHANGE UP (ref 70–99)
HCT VFR BLD CALC: 43 % — SIGNIFICANT CHANGE UP (ref 34.5–45)
HGB BLD-MCNC: 13.6 G/DL — SIGNIFICANT CHANGE UP (ref 11.5–15.5)
IMM GRANULOCYTES NFR BLD AUTO: 0.5 % — SIGNIFICANT CHANGE UP (ref 0–0.9)
LYMPHOCYTES # BLD AUTO: 1.11 K/UL — SIGNIFICANT CHANGE UP (ref 1–3.3)
LYMPHOCYTES # BLD AUTO: 18.8 % — SIGNIFICANT CHANGE UP (ref 13–44)
MAGNESIUM SERPL-MCNC: 2.1 MG/DL — SIGNIFICANT CHANGE UP (ref 1.6–2.6)
MCHC RBC-ENTMCNC: 29.1 PG — SIGNIFICANT CHANGE UP (ref 27–34)
MCHC RBC-ENTMCNC: 31.6 GM/DL — LOW (ref 32–36)
MCV RBC AUTO: 91.9 FL — SIGNIFICANT CHANGE UP (ref 80–100)
MONOCYTES # BLD AUTO: 0.39 K/UL — SIGNIFICANT CHANGE UP (ref 0–0.9)
MONOCYTES NFR BLD AUTO: 6.6 % — SIGNIFICANT CHANGE UP (ref 2–14)
NEUTROPHILS # BLD AUTO: 4.3 K/UL — SIGNIFICANT CHANGE UP (ref 1.8–7.4)
NEUTROPHILS NFR BLD AUTO: 73.1 % — SIGNIFICANT CHANGE UP (ref 43–77)
NRBC # BLD: 0 /100 WBCS — SIGNIFICANT CHANGE UP (ref 0–0)
PHOSPHATE SERPL-MCNC: 3.1 MG/DL — SIGNIFICANT CHANGE UP (ref 2.5–4.5)
PLATELET # BLD AUTO: 232 K/UL — SIGNIFICANT CHANGE UP (ref 150–400)
POTASSIUM SERPL-MCNC: 3.9 MMOL/L — SIGNIFICANT CHANGE UP (ref 3.5–5.3)
POTASSIUM SERPL-SCNC: 3.9 MMOL/L — SIGNIFICANT CHANGE UP (ref 3.5–5.3)
PROT SERPL-MCNC: 7.4 G/DL — SIGNIFICANT CHANGE UP (ref 6–8.3)
RBC # BLD: 4.68 M/UL — SIGNIFICANT CHANGE UP (ref 3.8–5.2)
RBC # FLD: 14.6 % — HIGH (ref 10.3–14.5)
RSV RNA NPH QL NAA+NON-PROBE: SIGNIFICANT CHANGE UP
SARS-COV-2 RNA SPEC QL NAA+PROBE: SIGNIFICANT CHANGE UP
SODIUM SERPL-SCNC: 143 MMOL/L — SIGNIFICANT CHANGE UP (ref 135–145)
WBC # BLD: 5.89 K/UL — SIGNIFICANT CHANGE UP (ref 3.8–10.5)
WBC # FLD AUTO: 5.89 K/UL — SIGNIFICANT CHANGE UP (ref 3.8–10.5)

## 2023-04-05 PROCEDURE — 70450 CT HEAD/BRAIN W/O DYE: CPT | Mod: MG

## 2023-04-05 PROCEDURE — 70496 CT ANGIOGRAPHY HEAD: CPT | Mod: MG

## 2023-04-05 PROCEDURE — 70498 CT ANGIOGRAPHY NECK: CPT | Mod: MG

## 2023-04-05 PROCEDURE — 87637 SARSCOV2&INF A&B&RSV AMP PRB: CPT

## 2023-04-05 PROCEDURE — 85014 HEMATOCRIT: CPT

## 2023-04-05 PROCEDURE — 71045 X-RAY EXAM CHEST 1 VIEW: CPT

## 2023-04-05 PROCEDURE — 70496 CT ANGIOGRAPHY HEAD: CPT | Mod: 26,MG

## 2023-04-05 PROCEDURE — 84484 ASSAY OF TROPONIN QUANT: CPT

## 2023-04-05 PROCEDURE — 83735 ASSAY OF MAGNESIUM: CPT

## 2023-04-05 PROCEDURE — 70498 CT ANGIOGRAPHY NECK: CPT | Mod: 26,MG

## 2023-04-05 PROCEDURE — 82330 ASSAY OF CALCIUM: CPT

## 2023-04-05 PROCEDURE — 80053 COMPREHEN METABOLIC PANEL: CPT

## 2023-04-05 PROCEDURE — 84295 ASSAY OF SERUM SODIUM: CPT

## 2023-04-05 PROCEDURE — 85018 HEMOGLOBIN: CPT

## 2023-04-05 PROCEDURE — 83605 ASSAY OF LACTIC ACID: CPT

## 2023-04-05 PROCEDURE — 82435 ASSAY OF BLOOD CHLORIDE: CPT

## 2023-04-05 PROCEDURE — 71045 X-RAY EXAM CHEST 1 VIEW: CPT | Mod: 26

## 2023-04-05 PROCEDURE — 84100 ASSAY OF PHOSPHORUS: CPT

## 2023-04-05 PROCEDURE — 99285 EMERGENCY DEPT VISIT HI MDM: CPT | Mod: 25

## 2023-04-05 PROCEDURE — 82803 BLOOD GASES ANY COMBINATION: CPT

## 2023-04-05 PROCEDURE — 82947 ASSAY GLUCOSE BLOOD QUANT: CPT

## 2023-04-05 PROCEDURE — G1004: CPT

## 2023-04-05 PROCEDURE — 84132 ASSAY OF SERUM POTASSIUM: CPT

## 2023-04-05 PROCEDURE — 85025 COMPLETE CBC W/AUTO DIFF WBC: CPT

## 2023-04-05 PROCEDURE — 99285 EMERGENCY DEPT VISIT HI MDM: CPT

## 2023-04-05 RX ORDER — SODIUM CHLORIDE 9 MG/ML
1000 INJECTION INTRAMUSCULAR; INTRAVENOUS; SUBCUTANEOUS ONCE
Refills: 0 | Status: COMPLETED | OUTPATIENT
Start: 2023-04-05 | End: 2023-04-05

## 2023-04-05 RX ADMIN — SODIUM CHLORIDE 2000 MILLILITER(S): 9 INJECTION INTRAMUSCULAR; INTRAVENOUS; SUBCUTANEOUS at 12:00

## 2023-04-05 NOTE — ED PROVIDER NOTE - NSFOLLOWUPINSTRUCTIONS_ED_ALL_ED_FT
Activities as tolerated. Please encourage good oral and fluid intake.     For dizziness, can use meclizine 25mg three times as day as needed (over the counter).    Please see your primary care doctor within 24-48 hours for further management of your symptoms.  Please follow up with neurology in one week for further evaluation of your symptoms.    Please seek emergent medical management if you have any worsening signs or symptoms, such as new onset weakness, persistent vomiting, worsening dizziness.

## 2023-04-05 NOTE — CONSULT NOTE ADULT - ASSESSMENT
DARCY LUNDBERG is a 72y (1950) woman with HLD on Atorvastatin presents with lightheaded, intermittent b/l upper extremity sensation changes x 5 days. Patient reports feeling lightheaded upon standing and walking, resolves with rest. She also has mild bifrontal headache, which she describes as tension. She reports numbness is not loss of sensation or tingling or even pins and needs, she is not sure how to describe this change in sensation. It progresses from left arm, to left face and upper thigh, but at times is right sided. It lasts couple minutes and resolved. Shes has these episodes for many years, 1x/month, however this current episode persisted prompting her to come to the ED. Denies weakness, loss of sensation, changes to speech, changes to vision, positional headache. Exam nonfocal. CTH and CTA normal     Impression: Vestibular migraine with aura     INCOMPLETE  Recommendation:   [] Orthostatic vitals  [] meclizine 25 mg Q8 prn  [] Follow up outpatient with neurology at [address] [place] [address]     DARCY LUNDBERG is a 72y (1950) woman with HLD on Atorvastatin presents with lightheaded, intermittent b/l upper extremity sensation changes x 5 days. Patient reports feeling lightheaded upon standing and walking, resolves with rest. She also has mild bifrontal headache, which she describes as tension. She reports numbness is not loss of sensation or tingling or even pins and needs, she is not sure how to describe this change in sensation. It progresses from left arm, to left face and upper thigh, but at times is right sided. It lasts couple minutes and resolved. Shes has these episodes for many years, 1x/month, however this current episode persisted prompting her to come to the ED. Denies weakness, loss of sensation, changes to speech, changes to vision, positional headache. Exam nonfocal. CTH and CTA normal     Impression: Vestibular migraine with aura     Recommendation:   [] Orthostatic vitals  [] meclizine 25 mg Q8 prn  [] No further neurological workup as inpatient   [] Follow up outpatient with neurology at [address] [place] [address]    Discussed with Dr. Redmond attending.

## 2023-04-05 NOTE — ED PROVIDER NOTE - PROGRESS NOTE DETAILS
NETTA Salinas MD  neuro consulted, will eval patient NETTA Salinas MD  neuro rec meclizine and outpatient neuro f/u as needed. labs wnl, ct unremarkable. dw pt, agrees with plan. hd and clinically stable for dc.

## 2023-04-05 NOTE — ED PROVIDER NOTE - ATTENDING CONTRIBUTION TO CARE
Alpesh Purcell MD:  I personally saw the patient and performed a substantive portion of the visit including all aspects of the medical decision making.    MDM: 72-year-old female with history of hyperlipidemia who presents with 3 days of dizziness described as lightheadedness associated with arm numbness, that has been alternating from her left to her right arm, but more persistent in her left arm, as well as some intermittent left facial numbness.  Patient states that she has symptoms for 20 years which last for 1 day at a time, and only happens for about 3 times per year, but now it is more constant for the last 3 days.  Also reports mild right-sided headache.    ROS: patient denies fevers, chills,, vision changes, neck pain or stiffness, weakness, vomiting, slurred speech, imbalance, cough, chest pain, shortness of breath, nausea, vomiting, diaphoresis, syncope, leg pain or swelling.    On examination, patient with stable vitals, well-appearing, in no acute distress. Cardiac examination RRR, lungs CTAB, abdomen soft and nontender, neurovascularly intact in all 4 extremities.  NEURO exam shows AAOx3, Cranial nerves III-XII intact. Strength intact with 5/5 strength in all 4 extremities. Sensation intact to light touch in all 4 extremities. No pronator drift. No dysmetria with finger-nose-finger. Normal gait without ataxia. Normal balance and speech.    Differential is broad and includes but is not limited to: CVA, TIA, intracranial mass, atypical presentation of ACS, electrolyte derangement, migraine/headache.  Will assess for ischemic vs hemorrhagic with CT scan. Will also evaluate with CTA for LVO and cerebral/vertebral artery dissection.     Will obtain labs to evaluate for hematologic disorder, metabolic derangements, hepatic and renal function.     My independent interpretation of the EKG shows:  Normal sinus rhythm with rate of 94 bpm, left axis deviation, normal intervals, no ST elevations or depressions.    Differential includes but is not limited to: See above    Patient with new problems requiring additional work-up and treatment, following orders: see above  Discussed case with: Neurology consultant  Obtained and reviewed external records: N/A  Additional history obtained from:  at bedside  Chronic conditions and social determinants of health affecting care: See above    Labs and imaging reviewed, nonactionable.  Neurology consulted, pending recommendations. Alpesh Purcell MD:  I personally saw the patient and performed a substantive portion of the visit including all aspects of the medical decision making.    MDM: 72-year-old female with history of hyperlipidemia who presents with 3 days of dizziness described as lightheadedness associated with arm numbness, that has been alternating from her left to her right arm, but more persistent in her left arm, as well as some intermittent left facial numbness.  Patient states that she has symptoms for 20 years which last for 1 day at a time, and only happens for about 3 times per year, but now it is more constant for the last 3 days.  Also reports mild right-sided headache.    ROS: patient denies fevers, chills,, vision changes, neck pain or stiffness, weakness, vomiting, slurred speech, imbalance, cough, chest pain, shortness of breath, nausea, vomiting, diaphoresis, syncope, leg pain or swelling.    On examination, patient with stable vitals, well-appearing, in no acute distress. Cardiac examination RRR, lungs CTAB, abdomen soft and nontender, neurovascularly intact in all 4 extremities.  NEURO exam shows AAOx3, Cranial nerves III-XII intact. Strength intact with 5/5 strength in all 4 extremities. Sensation intact to light touch in all 4 extremities. No pronator drift. No dysmetria with finger-nose-finger. Normal gait without ataxia. Normal balance and speech.    Differential is broad and includes but is not limited to: CVA, TIA, intracranial mass, atypical presentation of ACS, electrolyte derangement, migraine/headache.  Will assess for ischemic vs hemorrhagic with CT scan. Will also evaluate with CTA for LVO and cerebral/vertebral artery dissection.     Will obtain labs to evaluate for hematologic disorder, metabolic derangements, hepatic and renal function.     My independent interpretation of the EKG shows:  Normal sinus rhythm with rate of 94 bpm, left axis deviation, normal intervals, no ST elevations or depressions.    Differential includes but is not limited to: See above    Patient with new problems requiring additional work-up and treatment, following orders: see above  Discussed case with: Neurology consultant  Obtained and reviewed external records: N/A  Additional history obtained from:  at bedside  Chronic conditions and social determinants of health affecting care: See above    Labs and imaging reviewed, nonactionable.  Neurology consulted, pending recommendations.    Troponin within normal range, one-time troponin appropriate given duration of symptoms and atypical presentation.     My independent interpretation of the chest x-ray images shows no focal consolidation.   More details to be seen in the official radiologist read.    Patient was seen by neurology team who deemed likely secondary to migraine with aura, and no further neurology work-up as an inpatient, and to follow-up as outpatient.    Patient reassessed and has improved symptoms. Repeat neuro exam is benign without any neuro deficits. Ambulatory in the ED without ataxia or assistance.  Stable for discharge with close follow-up and strict return precautions. Discussed the indications and side-effects of applicable medications. The patient has been informed of all concerning signs and symptoms to return to Emergency Department, the necessity to follow up with PMD within 1-2 days and neurology within 3 to 5 days was explained, or to return to the ED if unable to follow-up appropriately, and the patient reports understanding of above with capacity and insight.

## 2023-04-05 NOTE — ED PROVIDER NOTE - CROS ED ROS STATEMENT
Discussion/Summary   Labs.    WBC 6.4 normal. Normal white blood cells.    Hg 13.5 normal. No anemia.    Hct 45.1 nomral. No anemia.    Total cholesterol is 142 normal.  Normal range is 200 or lower.    HDL (good) cholesterol is 61 normal.    Triglycerides cholesterol is 223 High, but better then before. Last time was worse, it was higher.    Normal range is 150 or lower.  ** Continue healthy diet, decrease any salt, greasy fatty, fast foods. Eat more greens, vegatables, fiber, fruits, lose some weight alittle.    LDL (bad) cholesterol is 36 normal, excellent. Normal range is 100 or lower.    TSH (thyroid) is 0.812 normal.    Urine microalbumin noted. Protein noted. So, need to drink more regular water. Control your blood pressure. Healthy diet.    Urine shows UTI \" Urinary Tract Infection\". I will send over antiboitics for you. Take as directed.         Verified Results  COMP METABOLIC PANEL WITH CBCA, LIPID PANEL AND TSH (CMP,CBCA,LIPFA,TSH) [id number] 12:01AM [facility]   [Nov 14, 2018 3:11PM [facility]]  Labs.   WBC 6.4 normal. Normal white blood cells.   Hg 13.5 normal. No anemia.   Hct 45.1 nomral. No anemia.   Total cholesterol is 142 normal. Normal range is 200 or lower.   HDL (good) cholesterol is 61 normal.   Triglycerides cholesterol is 223 High, but better then before. Last time was worse, it was higher.   Normal range is 150 or lower. ** Continue healthy diet, decrease any salt, greasy fatty, fast foods. Eat more greens, vegatables, fiber, fruits, lose some weight alittle.   LDL (bad) cholesterol is 36 normal, excellent. Normal range is 100 or lower.   TSH (thyroid) is 0.812 normal.   Urine microalbumin noted. Protein noted. So, need to drink more regular water. Control your blood pressure. Healthy diet.   Urine shows UTI \" Urinary Tract Infection\". I will send over antiboitics for you. Take as directed.     Test Name Result Flag Reference   WHITE BLOOD COUNT 6.4 K/mcL  4.2-11.0   RED CELL COUNT  4.61 mil/mcL  4.00-5.20   HEMOGLOBIN 13.5 g/dl  12.0-15.5   HEMATOCRIT 45.1 %  36.0-46.5   MEAN CORPUSCULAR VOLUME 97.8 fL  78.0-100.0   MEAN CORPUSCULAR HEMOGLOBIN 29.3 pg  26.0-34.0   MEAN CORPUSCULAR HGB CONC 29.9 g/dl L 32.0-36.5   RDW-CV 13.7 %  11.0-15.0   PLATELET COUNT 163 K/mcL  140-450   Platelet count verified by smear review.   ARABELLA% 64 %     LYM% 29 %     MON% 5 %     EOS% 1 %     BASO% 0 %     ARABELLA ABS 4.1 K/mcL  1.8-7.7   LYM ABS 1.9 K/mcL  1.0-4.0   MON ABS 0.3 K/mcL  0.3-0.9   EOS ABS 0.1 K/mcL  0.1-0.5   BASO ABS 0.0 K/mcL  0.0-0.3   FASTING STATUS UNKNOWN hrs     CHOLESTEROL 142 mg/dl  <200   Desirable            <200  Borderline High      200 to 239  High                 >=240   HDL CHOLESTEROL 61 mg/dl  >49   Low            <40  Borderline Low 40 to 49  Near Optimal   50 to 59  Optimal        >=60   TRIGLYCERIDES 223 mg/dl H <150   Normal                   <150  Borderline High          150 to 199  High                     200 to 499  Very High                >=500   LDL CHOLESTEROL (CALCULATED) 36 mg/dl  <130   OPTIMAL               <100  NEAR OPTIMAL          100-129  BORDERLINE HIGH       130-159  HIGH                  160-189  VERY HIGH             >=190   NON-HDL CHOLESTEROL 81 mg/dl     Therapeutic Target:  CHD and risk equivalents <130  Multiple risk factors    <160  0 to 1 risk factors      <190   CHOLESTEROL/HDL RATIO 2.3  <4.5   TSH 0.812 mcUnits/mL  0.350-5.000   DIFF TYPE      AUTOMATED DIFFERENTIAL   NRBC 0 /100 WBC  0   PERCENT IMMATURE GRANULOCYTES 1 %     ABSOLUTE IMMATURE GRANULOCYTES 0.0 K/mcL  0-0.2     URINALYSIS SCREEN with MICROSCOPIC IF INDICATED AND URINE CULTURE REFLEX [id number] 12:01AM [facility]   [Nov 14, 2018 3:11PM [facility]]  Labs.   WBC 6.4 normal. Normal white blood cells.   Hg 13.5 normal. No anemia.   Hct 45.1 nomral. No anemia.   Total cholesterol is 142 normal. Normal range is 200 or lower.   HDL (good) cholesterol is 61 normal.   Triglycerides  cholesterol is 223 High, but better then before. Last time was worse, it was higher.   Normal range is 150 or lower. ** Continue healthy diet, decrease any salt, greasy fatty, fast foods. Eat more greens, vegatables, fiber, fruits, lose some weight alittle.   LDL (bad) cholesterol is 36 normal, excellent. Normal range is 100 or lower.   TSH (thyroid) is 0.812 normal.   Urine microalbumin noted. Protein noted. So, need to drink more regular water. Control your blood pressure. Healthy diet.   Urine shows UTI \" Urinary Tract Infection\". I will send over antiboitics for you. Take as directed.     Test Name Result Flag Reference   URINE COLOR YELLOW  YELLOW   APPEARANCE, URINE HAZY     URINE GLUCOSE NEGATIVE mg/dl  NEGATIVE   URINE BILIRUBIN NEGATIVE  NEGATIVE   KETONES NEGATIVE mg/dl  NEGATIVE   URINE SPECIFIC GRAVITY 1.017  1.005-1.030   RBC-URINE SMALL A NEGATIVE   PH-URINE 5.0 Units  5.0-7.0   URINE PROTEIN >500 mg/dl A NEGATIVE   UROBILINOGEN-URINE 0.2 mg/dl  0.0-1.0   NITRITE NEGATIVE  NEGATIVE   WBC-URINE MODERATE A NEGATIVE   Culture indicated, results to follow.   SPECIMEN TYPE      URINE, CLEAN CATCH/MIDSTREAM   SQUAMOUS EPITHELIAL CELLS 1 to 5 /HPF  0-5   ERYTHROCYTES 1 to 3 /HPF  0-3   LEUKOCYTES 6 to 10 /HPF H 0-5   BACTERIA LARGE /HPF A NONE SEEN   HYALINE CASTS 1 to 5 /LPF  0-5   MUCUS PRESENT     YEAST PRESENT     AMORPHOUS MATERIAL PRESENT       MICROALBUMIN, URINE [id number] 12:01AM [facility]   [Nov 14, 2018 3:11PM [facility]]  Labs.   WBC 6.4 normal. Normal white blood cells.   Hg 13.5 normal. No anemia.   Hct 45.1 nomral. No anemia.   Total cholesterol is 142 normal. Normal range is 200 or lower.   HDL (good) cholesterol is 61 normal.   Triglycerides cholesterol is 223 High, but better then before. Last time was worse, it was higher.   Normal range is 150 or lower. ** Continue healthy diet, decrease any salt, greasy fatty, fast foods. Eat more greens, vegatables, fiber, fruits, lose some  weight alittle.   LDL (bad) cholesterol is 36 normal, excellent. Normal range is 100 or lower.   TSH (thyroid) is 0.812 normal.   Urine microalbumin noted. Protein noted. So, need to drink more regular water. Control your blood pressure. Healthy diet.   Urine shows UTI \" Urinary Tract Infection\". I will send over antiboitics for you. Take as directed.     Test Name Result Flag Reference   MICROALBUMIN 214.00 mg/dl     CREATININE RANDOM URINE 157.00 mg/dl     MICROALB/CREAT RATIO 1363.1 mg/g H <30   Consistent with clinical albuminuria.     TEST IN QUESTION, REFRIGERATED [id number] 12:01AM [facility]   [Nov 14, 2018 3:11PM [facility]]  Labs.   WBC 6.4 normal. Normal white blood cells.   Hg 13.5 normal. No anemia.   Hct 45.1 nomral. No anemia.   Total cholesterol is 142 normal. Normal range is 200 or lower.   HDL (good) cholesterol is 61 normal.   Triglycerides cholesterol is 223 High, but better then before. Last time was worse, it was higher.   Normal range is 150 or lower. ** Continue healthy diet, decrease any salt, greasy fatty, fast foods. Eat more greens, vegatables, fiber, fruits, lose some weight alittle.   LDL (bad) cholesterol is 36 normal, excellent. Normal range is 100 or lower.   TSH (thyroid) is 0.812 normal.   Urine microalbumin noted. Protein noted. So, need to drink more regular water. Control your blood pressure. Healthy diet.   Urine shows UTI \" Urinary Tract Infection\". I will send over antiboitics for you. Take as directed.     Test Name Result Flag Reference   TEST IN QUESTION, REFRIGERATED      Internal Issue Resolved, please disregard.     URINE CULTURE [id number] 12:01AM [facility]   [Nov 14, 2018 3:11PM [facility]]  Labs.   WBC 6.4 normal. Normal white blood cells.   Hg 13.5 normal. No anemia.   Hct 45.1 nomral. No anemia.   Total cholesterol is 142 normal. Normal range is 200 or lower.   HDL (good) cholesterol is 61 normal.   Triglycerides cholesterol is 223 High, but better  then before. Last time was worse, it was higher.   Normal range is 150 or lower. ** Continue healthy diet, decrease any salt, greasy fatty, fast foods. Eat more greens, vegatables, fiber, fruits, lose some weight alittle.   LDL (bad) cholesterol is 36 normal, excellent. Normal range is 100 or lower.   TSH (thyroid) is 0.812 normal.   Urine microalbumin noted. Protein noted. So, need to drink more regular water. Control your blood pressure. Healthy diet.   Urine shows UTI \" Urinary Tract Infection\". I will send over antiboitics for you. Take as directed.     Test Name Result Flag Reference   URINE CULTURE (Report) O    SPECIMEN DESCRIPTION (SDES): URINE, CLEAN CATCH/MIDSTREAM  CULTURE (CULT):        >100,000 CFU/mL KLEBSIELLA PNEUMONIAE  REPORT STATUS (RPT):     FINAL 10/28/2018  SUSCEPTIBILITY(ZZ00)  ORGANISM (ORG):        >100,000 CFU/mL KLEBSIELLA PNEUMONIAE  METHOD (MTYP):        RICCI  AMPICILLIN/SULBACTAM (AMS):  <=2 SUSCEPTIBLE  PIPERACILLIN/TAZOBAC (PTZ):  <=4 SUSCEPTIBLE  CEFAZOLIN (URINE) (CFZU):   <=4 SUSCEPTIBLE  CEFAZOLIN (URINE) (CFZU):   If susceptible, cefazolin predicts activity for other oral cephalosporins (cefaclor, cefdinir, cefpodoxime, cefprozil, cefuroxime, cephalexin, and loracarbef) when used for uncomplicated UTIs due to E. coli, K. pneumoniae, and P. mirabilis.  CEFAZOLIN (CFZ):       <=4 SUSCEPTIBLE  CEFAZOLIN (CFZ):       This cefazolin interpretation should be used when considering treatment for any infection other than an uncomplicated UTI.  GENTAMICIN (GM):       <=1 SUSCEPTIBLE  TOBRAMYCIN (TOB):       <=1 SUSCEPTIBLE  CIPROFLOXACIN (CIP):     <=0.25 SUSCEPTIBLE  LEVOFLOXACIN (LVX):      <=0.12 SUSCEPTIBLE  NITROFURANTOIN (NIT):     64 INTERMEDIATE  TRIMETH / SULFA (TRSUV):   <=20 SUSCEPTIBLE       Message   Labs.    WBC 6.4 normal. Normal white blood cells.    Hg 13.5 normal. No anemia.    Hct 45.1 nomral. No anemia.    Total cholesterol is 142 normal.  Normal range is 200 or lower.     HDL (good) cholesterol is 61 normal.    Triglycerides cholesterol is 223 High, but better then before. Last time was worse, it was higher.    Normal range is 150 or lower.  ** Continue healthy diet, decrease any salt, greasy fatty, fast foods. Eat more greens, vegatables, fiber, fruits, lose some weight alittle.    LDL (bad) cholesterol is 36 normal, excellent. Normal range is 100 or lower.    TSH (thyroid) is 0.812 normal.    Urine microalbumin noted. Protein noted. So, need to drink more regular water. Control your blood pressure. Healthy diet.    Urine shows UTI \" Urinary Tract Infection\". I will send over antiboitics for you. Take as directed.       all other ROS negative except as per HPI

## 2023-04-05 NOTE — ED ADULT NURSE NOTE - OBJECTIVE STATEMENT
Pt 71 y/o female, AxOx3, presents to ED from home complaining of lightheadedness x 3 days associated with alternating unilateral arm numbness. Endorsing multiple episodes like this x 20 years but typically symptoms resolve on its own, different from this episode. Pt is well appearing, speaking full sentences without difficulty. Breathing spontaneous and unlabored. Upon assessment, abdomen soft and nontender, +strong peripheral pulses, moving all extremities without difficulty, lungs clear. Currently endorsing LUE numbness at this time, no weakness noted on exam. Reporting increased social stressors this past weekend. Safety and comfort measures initiated- bed placed in lowest position and side rails raised. Pt oriented to call bell system.

## 2023-04-05 NOTE — ED PROVIDER NOTE - CLINICAL SUMMARY MEDICAL DECISION MAKING FREE TEXT BOX
72-year-old female with past medical history of hyperlipidemia, presenting with 3 days of lightheadedness and arm numbness. will eval for lightheadness with heme vs metabolic vs neurologic etiologies with labs, ekg, ct head/neck, will dw neuro and will reassess.

## 2023-04-05 NOTE — CONSULT NOTE ADULT - NSCONSULTADDITIONALINFOA_GEN_ALL_CORE
ATTENDING ATTESTATION:    case was discussed with me over the phone. I agree with assessment and plan as documented above.    JASON FONSEAC M.D

## 2023-04-05 NOTE — ED PROVIDER NOTE - OBJECTIVE STATEMENT
72-year-old female with past medical history of hyperlipidemia, presenting with 3 days of lightheadedness and arm numbness.  Patient states that she has had persistent lightheadedness and dizziness that has been constant for 3 days, worse when walking around.  Patient states that she has had stressors this past week due to increased social events.  Patient also endorses unilateral arm numbness sometimes left or right-sided as well as intermittent left facial numbness.  Patient came to ER for further evaluation.  Patient has had similar episodes in the past 20 years, which resolves on its own. also endorses mild unsteady walking.    Patient denies LOC, nausea vomiting, vision changes, weakness, fevers, sick contacts, chest pain, shortness of breath.

## 2023-04-05 NOTE — ED PROVIDER NOTE - NSFOLLOWUPCLINICS_GEN_ALL_ED_FT
[facility]  Neurology  [address] [place] [address] [address] [place] [address]  Phone: [phone]  Fax:

## 2023-04-05 NOTE — ED PROVIDER NOTE - PATIENT PORTAL LINK FT
You can access the FollowMyHealth Patient Portal offered by [facility] by registering at the following website: [email or web address]. By joining [facility]’s FollowMyHealth portal, you will also be able to view your health information using other applications (apps) compatible with our system.

## 2023-04-05 NOTE — CONSULT NOTE ADULT - SUBJECTIVE AND OBJECTIVE BOX
Neurology - Consult Note    -  Spectra: [phone] ([facility]), [phone] ([facility])  -    HPI: Patient DARCY LUNDBERG is a 72y (1950) woman with HLD on Atorvastatin presents with lightheaded, intermittent b/l upper extremity sensation changes x 5 days. Patient reports feeling lightheaded upon standing and walking, resolves with rest. She also has mild bifrontal headache, which she describes as tension. She reports numbness is not loss of sensation or tingling or even pins and needs, she is not sure how to describe this change in sensation. It progresses from left arm, to left face and upper thigh, but at times is right sided. It lasts couple minutes and resolved. Shes has these episodes for many years, 1x/month, however this current episode persisted prompting her to come to the ED. Denies weakness, loss of sensation, changes to speech, changes to vision, positional headache.      Review of Systems:    All other review of systems is negative unless indicated above.    Allergies:  No Known Allergies      PMHx/PSHx/Family Hx: As above, otherwise see below   HLD (hyperlipidemia)        Social Hx:  No current use of tobacco, alcohol, or illicit drugs      Medications:  MEDICATIONS  (STANDING):    MEDICATIONS  (PRN):      Vitals:  T(C): 36.8 (04-05-23 @ 14:10), Max: 37 (04-05-23 @ 12:00)  HR: 96 (04-05-23 @ 14:10) (96 - 99)  BP: 134/85 (04-05-23 @ 14:10) (134/85 - 163/95)  RR: 17 (04-05-23 @ 14:10) (17 - 20)  SpO2: 94% (04-05-23 @ 14:10) (94% - 99%)    Physical Examination:   General - NAD    Neurologic Exam:  Mental status - Awake, Alert, Oriented to person, place, and time. Speech fluent, repetition and naming intact. Follows simple and complex commands.     Cranial nerves - PERRL, VFF, EOMI, face sensation (V1-V3) intact b/l, facial strength intact without asymmetry b/l, hearing intact b/l, palate with symmetric elevation,  sternocleidomastiod 5/5 strength b/l, tongue midline on protrusion with full lateral movement    Motor - Normal bulk and tone throughout. No pronator drift.  Strength testing            Deltoid      Biceps      Triceps     Wrist Extension    Wrist Flexion             R            5                 5               5                     5                              5                        5                   L             5                 5               5                     5                              5                        5                              Hip Flexion    Hip Extension      Knee Flexion    Knee Extension    Dorsiflexion    Plantar Flexion  R              5                           5                       5                           5                            5                          5  L              5                           5                        5                           5                            5                          5    Sensation - Light touch/pain/temperature  intact throughout    DTR's -             Biceps      Triceps     Brachioradialis      Patellar    Ankle    Toes/plantar response  R             2+             2+                  2+                       2+            2+                 Down  L              2+             2+                 2+                        2+           2+                 Down    Coordination - Finger to Nose and heel to shin intact b/l. No tremors appreciated    Gait and station - Normal casual gait. Romberg (-)    Labs:                        13.6   5.89  )-----------( 232      ( 05 Apr 2023 12:06 )             43.0     04-05    143  |  105  |  11  ----------------------------<  94  3.9   |  26  |  0.63    Ca    9.4      05 Apr 2023 12:06  Phos  3.1     04-05  Mg     2.1     04-05    TPro  7.4  /  Alb  4.6  /  TBili  0.4  /  DBili  x   /  AST  21  /  ALT  29  /  AlkPhos  87  04-05    CAPILLARY BLOOD GLUCOSE        LIVER FUNCTIONS - ( 05 Apr 2023 12:06 )  Alb: 4.6 g/dL / Pro: 7.4 g/dL / ALK PHOS: 87 U/L / ALT: 29 U/L / AST: 21 U/L / GGT: x           Radiology:  CT Head No Cont:  (05 Apr 2023 12:48)  < from: CT Head No Cont (04.05.23 @ 12:48) >  CT head:  -No acute transcortical infarct or intracranial hemorrhage.  -White matter small vessel disease.    CT angiogram head:  -No vaso-occlusive disease.    CT angiogram neck:  -No vaso-occlusive disease.

## 2023-04-06 ENCOUNTER — NON-APPOINTMENT (OUTPATIENT)
Age: 73
End: 2023-04-06

## 2023-04-13 ENCOUNTER — NON-APPOINTMENT (OUTPATIENT)
Age: 73
End: 2023-04-13

## 2023-04-13 ENCOUNTER — APPOINTMENT (OUTPATIENT)
Dept: INTERNAL MEDICINE | Facility: CLINIC | Age: 73
End: 2023-04-13
Payer: MEDICARE

## 2023-04-13 ENCOUNTER — RESULT REVIEW (OUTPATIENT)
Age: 73
End: 2023-04-13

## 2023-04-13 VITALS
OXYGEN SATURATION: 96 % | BODY MASS INDEX: 23.79 KG/M2 | HEIGHT: 61 IN | HEART RATE: 97 BPM | WEIGHT: 126 LBS | SYSTOLIC BLOOD PRESSURE: 136 MMHG | DIASTOLIC BLOOD PRESSURE: 83 MMHG | TEMPERATURE: 98 F

## 2023-04-13 DIAGNOSIS — R20.0 ANESTHESIA OF SKIN: ICD-10-CM

## 2023-04-13 DIAGNOSIS — G43.909 MIGRAINE, UNSPECIFIED, NOT INTRACTABLE, W/OUT STATUS MIGRAINOSUS: ICD-10-CM

## 2023-04-13 PROBLEM — E78.5 HYPERLIPIDEMIA, UNSPECIFIED: Chronic | Status: ACTIVE | Noted: 2023-04-05

## 2023-04-13 PROCEDURE — 99214 OFFICE O/P EST MOD 30 MIN: CPT

## 2023-04-13 NOTE — ASSESSMENT
[FreeTextEntry1] : Pt w HA, numbness left arm/leg, dizziness/lightheadedness. Apparenty this has occurred many times past 30 yrs?\par CT neg \par will order MRI/A head/neck, carotid dopps\par refer to Neuro Dr. Esposito\par Start asprin 81 mg\par double the statin to 20 mg\par f/u 1 mo

## 2023-04-13 NOTE — HISTORY OF PRESENT ILLNESS
[FreeTextEntry8] : 73 YO woman w HLD, o.penia, fibocystic br, low D\par recently in ER for lightheadedness, labs nl, Head CT neg, was seen by Neuro who dx Ocular migraine; ref to Neuro but ppointmt is 8/23.\par Pt states for 30 yrs gets episodes of HA w lightheadedness and left arm/leg numbness, lasting 1 day.\par This time it's lasting longer ~ 5 dd. \par Feels sl naus from the dizziness. No recent travel eg. cruise /motion sickness.\par She did mention to cardiol some yrs ago, described w/u w MRI many yrs ago which was neg.\par No new meds, only takes lip 10 mg. occas MVI.\par Appetite good. Sleeping well.

## 2023-04-13 NOTE — PHYSICAL EXAM
[No Acute Distress] : no acute distress [Well Nourished] : well nourished [Well Developed] : well developed [Well-Appearing] : well-appearing [Normal Sclera/Conjunctiva] : normal sclera/conjunctiva [PERRL] : pupils equal round and reactive to light [EOMI] : extraocular movements intact [Normal Outer Ear/Nose] : the outer ears and nose were normal in appearance [Normal Oropharynx] : the oropharynx was normal [Normal TMs] : both tympanic membranes were normal [No JVD] : no jugular venous distention [No Lymphadenopathy] : no lymphadenopathy [Supple] : supple [Thyroid Normal, No Nodules] : the thyroid was normal and there were no nodules present [No Respiratory Distress] : no respiratory distress  [No Accessory Muscle Use] : no accessory muscle use [Clear to Auscultation] : lungs were clear to auscultation bilaterally [Normal Rate] : normal rate  [Regular Rhythm] : with a regular rhythm [Normal S1, S2] : normal S1 and S2 [No Murmur] : no murmur heard [No Carotid Bruits] : no carotid bruits [No Abdominal Bruit] : a ~M bruit was not heard ~T in the abdomen [No Varicosities] : no varicosities [Pedal Pulses Present] : the pedal pulses are present [No Edema] : there was no peripheral edema [No Palpable Aorta] : no palpable aorta [No Extremity Clubbing/Cyanosis] : no extremity clubbing/cyanosis [Soft] : abdomen soft [Non Tender] : non-tender [Non-distended] : non-distended [No Masses] : no abdominal mass palpated [No HSM] : no HSM [Normal Bowel Sounds] : normal bowel sounds [Normal Posterior Cervical Nodes] : no posterior cervical lymphadenopathy [Normal Anterior Cervical Nodes] : no anterior cervical lymphadenopathy [No CVA Tenderness] : no CVA  tenderness [No Spinal Tenderness] : no spinal tenderness [No Joint Swelling] : no joint swelling [Grossly Normal Strength/Tone] : grossly normal strength/tone [No Rash] : no rash [Coordination Grossly Intact] : coordination grossly intact [No Focal Deficits] : no focal deficits [Normal Gait] : normal gait [Deep Tendon Reflexes (DTR)] : deep tendon reflexes were 2+ and symmetric [Normal Affect] : the affect was normal [Normal Insight/Judgement] : insight and judgment were intact [de-identified] : rhomberg neg; does lose balance for a sec while walking back to table; strength and sens nl in arms/legs

## 2023-04-28 ENCOUNTER — APPOINTMENT (OUTPATIENT)
Dept: INTERNAL MEDICINE | Facility: CLINIC | Age: 73
End: 2023-04-28
Payer: MEDICARE

## 2023-04-28 VITALS
WEIGHT: 123 LBS | OXYGEN SATURATION: 96 % | DIASTOLIC BLOOD PRESSURE: 92 MMHG | BODY MASS INDEX: 23.22 KG/M2 | TEMPERATURE: 97.2 F | HEART RATE: 96 BPM | HEIGHT: 61 IN | SYSTOLIC BLOOD PRESSURE: 153 MMHG

## 2023-04-28 VITALS — SYSTOLIC BLOOD PRESSURE: 144 MMHG | DIASTOLIC BLOOD PRESSURE: 98 MMHG

## 2023-04-28 DIAGNOSIS — R00.0 TACHYCARDIA, UNSPECIFIED: ICD-10-CM

## 2023-04-28 DIAGNOSIS — F41.9 ANXIETY DISORDER, UNSPECIFIED: ICD-10-CM

## 2023-04-28 PROCEDURE — 99214 OFFICE O/P EST MOD 30 MIN: CPT

## 2023-04-28 NOTE — ASSESSMENT
[FreeTextEntry1] : Pt w elev HR and BP recently-\par declined ekg today stating ER did it already.\par DDX incl Htn, thy ds, THOMPSON, PEmb, stress rxn.\par Will check labs incl TSH and d-dimer (low susp but pox 96 and HR 96_)\par Will send in Amlod 2.5 mg- contin to home monitor and if consistenly high will start the med.\par Will refer to Cardiol per request.\par Consid rsleep study\par Stress reduction incl exer; salt reduction avoid canned soups etc.\par \par pt has cruise coming up, may need note to cancel and get refund if not improving.\par f/u next wk\par \par \par

## 2023-04-28 NOTE — PHYSICAL EXAM
[No Acute Distress] : no acute distress [Well Nourished] : well nourished [Well Developed] : well developed [Normal Sclera/Conjunctiva] : normal sclera/conjunctiva [PERRL] : pupils equal round and reactive to light [EOMI] : extraocular movements intact [Normal Outer Ear/Nose] : the outer ears and nose were normal in appearance [Normal Oropharynx] : the oropharynx was normal [No JVD] : no jugular venous distention [No Lymphadenopathy] : no lymphadenopathy [Supple] : supple [Thyroid Normal, No Nodules] : the thyroid was normal and there were no nodules present [No Respiratory Distress] : no respiratory distress  [No Accessory Muscle Use] : no accessory muscle use [Normal Rate] : normal rate  [Clear to Auscultation] : lungs were clear to auscultation bilaterally [Regular Rhythm] : with a regular rhythm [Normal S1, S2] : normal S1 and S2 [No Murmur] : no murmur heard [No Carotid Bruits] : no carotid bruits [No Abdominal Bruit] : a ~M bruit was not heard ~T in the abdomen [No Varicosities] : no varicosities [Pedal Pulses Present] : the pedal pulses are present [No Edema] : there was no peripheral edema [No Palpable Aorta] : no palpable aorta [No Extremity Clubbing/Cyanosis] : no extremity clubbing/cyanosis [Soft] : abdomen soft [Non Tender] : non-tender [Non-distended] : non-distended [No Masses] : no abdominal mass palpated [No HSM] : no HSM [Normal Bowel Sounds] : normal bowel sounds [Normal Posterior Cervical Nodes] : no posterior cervical lymphadenopathy [Normal Anterior Cervical Nodes] : no anterior cervical lymphadenopathy [No CVA Tenderness] : no CVA  tenderness [No Spinal Tenderness] : no spinal tenderness [No Joint Swelling] : no joint swelling [Grossly Normal Strength/Tone] : grossly normal strength/tone [No Rash] : no rash [No Focal Deficits] : no focal deficits [Coordination Grossly Intact] : coordination grossly intact [Normal Gait] : normal gait [Deep Tendon Reflexes (DTR)] : deep tendon reflexes were 2+ and symmetric [Normal Affect] : the affect was normal [Normal Insight/Judgement] : insight and judgment were intact [de-identified] : no abn noted on back in area of pt concern [de-identified] : anxious  [de-identified] : left neck trapezius tightness;

## 2023-04-28 NOTE — HISTORY OF PRESENT ILLNESS
[FreeTextEntry8] : 73 yo woman w HLD, o.penia c/o elev BP readings at home the  past 2 wks-but then goes back down:\par 158/100, 119/82, 134/90 w elev HR; no sob, no CP, no MCCLAIN ; the lightheadedness is improving.\par  No HA. She doesn't exercise, not too active. \par Has upcoming MRI/A and also Neuro appointmt Dr. Schoenberg.\par She had labs/EKG last mo in ER, ok. (Thy not done)\par h/o hyperthy s/p MARTELL no replacement.\par She saw Cardiol in '17 for similar sxs, w/u neg.\par She feels unrestful sleep after 7 hrs, not sure if snores. Feels "whole body shakes" when she wakes up. \par Under stress in general- her elderly mother who is strong-willed, her kids etc.\par She does have left neck/shoulder pain/spasm, also feels there's an assymetry in left side of upper back or a lump, her  doesn’t see anything,, she cannot see it. Hasn't been on NSAIDs only tylenol.\par She eats canned soup but says she is cutitng down on salt.\par

## 2023-04-29 LAB
ALBUMIN SERPL ELPH-MCNC: 5.2 G/DL
ALP BLD-CCNC: 92 U/L
ALT SERPL-CCNC: 34 U/L
ANION GAP SERPL CALC-SCNC: 15 MMOL/L
AST SERPL-CCNC: 26 U/L
BASOPHILS # BLD AUTO: 0.08 K/UL
BASOPHILS NFR BLD AUTO: 0.9 %
BILIRUB SERPL-MCNC: 0.4 MG/DL
BUN SERPL-MCNC: 21 MG/DL
CALCIUM SERPL-MCNC: 10.5 MG/DL
CHLORIDE SERPL-SCNC: 103 MMOL/L
CO2 SERPL-SCNC: 22 MMOL/L
CREAT SERPL-MCNC: 0.68 MG/DL
DEPRECATED D DIMER PPP IA-ACNC: <150 NG/ML DDU
EGFR: 91 ML/MIN/1.73M2
EOSINOPHIL # BLD AUTO: 0.06 K/UL
EOSINOPHIL NFR BLD AUTO: 0.7 %
GLUCOSE SERPL-MCNC: 87 MG/DL
HCT VFR BLD CALC: 45.3 %
HGB BLD-MCNC: 14.6 G/DL
IMM GRANULOCYTES NFR BLD AUTO: 0.3 %
LYMPHOCYTES # BLD AUTO: 1.99 K/UL
LYMPHOCYTES NFR BLD AUTO: 22.2 %
MAN DIFF?: NORMAL
MCHC RBC-ENTMCNC: 29.5 PG
MCHC RBC-ENTMCNC: 32.2 GM/DL
MCV RBC AUTO: 91.5 FL
MONOCYTES # BLD AUTO: 0.64 K/UL
MONOCYTES NFR BLD AUTO: 7.1 %
NEUTROPHILS # BLD AUTO: 6.18 K/UL
NEUTROPHILS NFR BLD AUTO: 68.8 %
PLATELET # BLD AUTO: 276 K/UL
POTASSIUM SERPL-SCNC: 4.2 MMOL/L
PROT SERPL-MCNC: 8.1 G/DL
RBC # BLD: 4.95 M/UL
RBC # FLD: 14.5 %
SODIUM SERPL-SCNC: 141 MMOL/L
TSH SERPL-ACNC: 4.73 UIU/ML
WBC # FLD AUTO: 8.98 K/UL

## 2023-05-05 ENCOUNTER — APPOINTMENT (OUTPATIENT)
Dept: ULTRASOUND IMAGING | Facility: CLINIC | Age: 73
End: 2023-05-05
Payer: MEDICARE

## 2023-05-05 ENCOUNTER — OUTPATIENT (OUTPATIENT)
Dept: OUTPATIENT SERVICES | Facility: HOSPITAL | Age: 73
LOS: 1 days | End: 2023-05-05
Payer: MEDICARE

## 2023-05-05 ENCOUNTER — APPOINTMENT (OUTPATIENT)
Dept: MRI IMAGING | Facility: CLINIC | Age: 73
End: 2023-05-05
Payer: MEDICARE

## 2023-05-05 DIAGNOSIS — R20.0 ANESTHESIA OF SKIN: ICD-10-CM

## 2023-05-05 PROCEDURE — 70546 MR ANGIOGRAPH HEAD W/O&W/DYE: CPT | Mod: 26

## 2023-05-05 PROCEDURE — 93880 EXTRACRANIAL BILAT STUDY: CPT

## 2023-05-05 PROCEDURE — 93880 EXTRACRANIAL BILAT STUDY: CPT | Mod: 26

## 2023-05-05 PROCEDURE — A9585: CPT

## 2023-05-05 PROCEDURE — 70546 MR ANGIOGRAPH HEAD W/O&W/DYE: CPT

## 2023-05-08 ENCOUNTER — RX RENEWAL (OUTPATIENT)
Age: 73
End: 2023-05-08

## 2023-05-08 ENCOUNTER — APPOINTMENT (OUTPATIENT)
Dept: INTERNAL MEDICINE | Facility: CLINIC | Age: 73
End: 2023-05-08

## 2023-05-09 ENCOUNTER — APPOINTMENT (OUTPATIENT)
Dept: INTERNAL MEDICINE | Facility: CLINIC | Age: 73
End: 2023-05-09

## 2023-08-18 ENCOUNTER — APPOINTMENT (OUTPATIENT)
Dept: INTERNAL MEDICINE | Facility: CLINIC | Age: 73
End: 2023-08-18
Payer: MEDICARE

## 2023-08-18 ENCOUNTER — NON-APPOINTMENT (OUTPATIENT)
Age: 73
End: 2023-08-18

## 2023-08-18 VITALS
SYSTOLIC BLOOD PRESSURE: 148 MMHG | OXYGEN SATURATION: 97 % | TEMPERATURE: 98.7 F | WEIGHT: 122 LBS | DIASTOLIC BLOOD PRESSURE: 77 MMHG | BODY MASS INDEX: 23.03 KG/M2 | HEART RATE: 94 BPM | HEIGHT: 61 IN

## 2023-08-18 VITALS — SYSTOLIC BLOOD PRESSURE: 142 MMHG | DIASTOLIC BLOOD PRESSURE: 92 MMHG

## 2023-08-18 DIAGNOSIS — R03.0 ELEVATED BLOOD-PRESSURE READING, W/OUT DIAGNOSIS OF HYPERTENSION: ICD-10-CM

## 2023-08-18 DIAGNOSIS — E03.9 HYPOTHYROIDISM, UNSPECIFIED: ICD-10-CM

## 2023-08-18 DIAGNOSIS — R69 ILLNESS, UNSPECIFIED: ICD-10-CM

## 2023-08-18 DIAGNOSIS — N60.19 DIFFUSE CYSTIC MASTOPATHY OF UNSPECIFIED BREAST: ICD-10-CM

## 2023-08-18 DIAGNOSIS — E78.5 HYPERLIPIDEMIA, UNSPECIFIED: ICD-10-CM

## 2023-08-18 DIAGNOSIS — M85.80 OTHER SPECIFIED DISORDERS OF BONE DENSITY AND STRUCTURE, UNSPECIFIED SITE: ICD-10-CM

## 2023-08-18 DIAGNOSIS — R73.03 PREDIABETES.: ICD-10-CM

## 2023-08-18 DIAGNOSIS — Z00.00 ENCOUNTER FOR GENERAL ADULT MEDICAL EXAMINATION W/OUT ABNORMAL FINDINGS: ICD-10-CM

## 2023-08-18 DIAGNOSIS — R42 DIZZINESS AND GIDDINESS: ICD-10-CM

## 2023-08-18 PROCEDURE — 36415 COLL VENOUS BLD VENIPUNCTURE: CPT

## 2023-08-18 PROCEDURE — G0439: CPT

## 2023-08-18 NOTE — HEALTH RISK ASSESSMENT
[Good] : ~his/her~  mood as  good [Never] : Never [No] : No [No falls in past year] : Patient reported no falls in the past year [0] : 2) Feeling down, depressed, or hopeless: Not at all (0) [PHQ-2 Negative - No further assessment needed] : PHQ-2 Negative - No further assessment needed [de-identified] : some, could do more [de-identified] : healthy [[id number]] : 0 [Patient reported mammogram was normal] : Patient reported mammogram was normal [Patient reported bone density results were normal] : Patient reported bone density results were normal [Change in mental status noted] : No change in mental status noted [Language] : denies difficulty with language [None] : None [High Risk Behavior] : no high risk behavior [Feels Safe at Home] : Feels safe at home [Fully functional (bathing, dressing, toileting, transferring, walking, feeding)] : Fully functional (bathing, dressing, toileting, transferring, walking, feeding) [Fully functional (using the telephone, shopping, preparing meals, housekeeping, doing laundry, using] : Fully functional and needs no help or supervision to perform IADLs (using the telephone, shopping, preparing meals, housekeeping, doing laundry, using transportation, managing medications and managing finances) [Reports changes in hearing] : Reports no changes in hearing [Reports changes in vision] : Reports no changes in vision [Reports changes in dental health] : Reports no changes in dental health [Smoke Detector] : smoke detector [Carbon Monoxide Detector] : carbon monoxide detector [Safety elements used in home] : safety elements used in home [Seat Belt] :  uses seat belt [Sunscreen] : uses sunscreen [MammogramDate] : 08/22 [BoneDensityDate] : 08/22 [ColonoscopyDate] : 2016

## 2023-08-18 NOTE — HISTORY OF PRESENT ILLNESS
[FreeTextEntry1] : CPE [de-identified] : 71 yo woman w HLD, fibrocyst breasts/biops, low D, o,penia, allergic rhinitis Doing well. Shingrix in '18 x2; other vax uptodate; tdap '14 will do next yr Mammo due Dexa 8/22 -1.6 -2.3 no meds  [place] 3/16, amenable to seeing GI, f/h Gastric ca. in father. Home BPs usually ~ 120s/70, occas up to 136/ then comes back down. Some salt in diet. Had seen Card Dr. Brian in '17 for abn ekg, w/u neg.  She never started the Amlod 2.5, read about s.e. and also was having dizziness- saw Neuro and ENT nothing found. Also saw Neuro Dr. Schoenberg, for pinched n. in neck, did PT,better now. She says she does take the statin.

## 2023-08-18 NOTE — ASSESSMENT
[FreeTextEntry1] : EKG nsr at    prwp low voltage no change from prior.  Imp: Pt is stable. Elev BP, pt not tking anti-htn prescribed.  Amenable to f/u w Cardiol Dr. Brian. Amenable to GI for possible EGD/[facility] given f/h Gastric ca. Mammo/US routine labs now.  Tdap next yr

## 2023-08-22 LAB
25(OH)D3 SERPL-MCNC: 35.4 NG/ML
ALBUMIN SERPL ELPH-MCNC: 4.9 G/DL
ALP BLD-CCNC: 89 U/L
ALT SERPL-CCNC: 25 U/L
ANION GAP SERPL CALC-SCNC: 13 MMOL/L
AST SERPL-CCNC: 18 U/L
BILIRUB SERPL-MCNC: 0.6 MG/DL
BUN SERPL-MCNC: 15 MG/DL
CALCIUM SERPL-MCNC: 10.4 MG/DL
CHLORIDE SERPL-SCNC: 105 MMOL/L
CHOLEST SERPL-MCNC: 180 MG/DL
CO2 SERPL-SCNC: 24 MMOL/L
CREAT SERPL-MCNC: 0.68 MG/DL
EGFR: 91 ML/MIN/1.73M2
ESTIMATED AVERAGE GLUCOSE: 131 MG/DL
GLUCOSE SERPL-MCNC: 99 MG/DL
HBA1C MFR BLD HPLC: 6.2 %
HDLC SERPL-MCNC: 60 MG/DL
LDLC SERPL CALC-MCNC: 94 MG/DL
NONHDLC SERPL-MCNC: 120 MG/DL
POTASSIUM SERPL-SCNC: 4.4 MMOL/L
PROT SERPL-MCNC: 7.6 G/DL
SODIUM SERPL-SCNC: 142 MMOL/L
TRIGL SERPL-MCNC: 155 MG/DL
TSH SERPL-ACNC: 4.16 UIU/ML

## 2023-08-24 ENCOUNTER — APPOINTMENT (OUTPATIENT)
Dept: NEUROLOGY | Facility: HOSPITAL | Age: 73
End: 2023-08-24

## 2023-10-24 ENCOUNTER — RESULT REVIEW (OUTPATIENT)
Age: 73
End: 2023-10-24

## 2023-10-24 ENCOUNTER — APPOINTMENT (OUTPATIENT)
Dept: ULTRASOUND IMAGING | Facility: CLINIC | Age: 73
End: 2023-10-24
Payer: MEDICARE

## 2023-10-24 ENCOUNTER — APPOINTMENT (OUTPATIENT)
Dept: MAMMOGRAPHY | Facility: CLINIC | Age: 73
End: 2023-10-24
Payer: MEDICARE

## 2023-10-24 ENCOUNTER — OUTPATIENT (OUTPATIENT)
Dept: OUTPATIENT SERVICES | Facility: HOSPITAL | Age: 73
LOS: 1 days | End: 2023-10-24
Payer: MEDICARE

## 2023-10-24 DIAGNOSIS — Z00.00 ENCOUNTER FOR GENERAL ADULT MEDICAL EXAMINATION WITHOUT ABNORMAL FINDINGS: ICD-10-CM

## 2023-10-24 DIAGNOSIS — Z00.8 ENCOUNTER FOR OTHER GENERAL EXAMINATION: ICD-10-CM

## 2023-10-24 DIAGNOSIS — N60.19 DIFFUSE CYSTIC MASTOPATHY OF UNSPECIFIED BREAST: ICD-10-CM

## 2023-10-24 PROCEDURE — 77063 BREAST TOMOSYNTHESIS BI: CPT | Mod: 26

## 2023-10-24 PROCEDURE — 76641 ULTRASOUND BREAST COMPLETE: CPT | Mod: 26,50

## 2023-10-24 PROCEDURE — 77067 SCR MAMMO BI INCL CAD: CPT | Mod: 26

## 2023-10-24 PROCEDURE — 76641 ULTRASOUND BREAST COMPLETE: CPT

## 2023-10-24 PROCEDURE — 77063 BREAST TOMOSYNTHESIS BI: CPT

## 2023-10-24 PROCEDURE — 77067 SCR MAMMO BI INCL CAD: CPT

## 2024-02-12 ENCOUNTER — RX RENEWAL (OUTPATIENT)
Age: 74
End: 2024-02-12

## 2024-03-13 ENCOUNTER — NON-APPOINTMENT (OUTPATIENT)
Age: 74
End: 2024-03-13

## 2024-03-26 ENCOUNTER — APPOINTMENT (OUTPATIENT)
Dept: COLORECTAL SURGERY | Facility: CLINIC | Age: 74
End: 2024-03-26
Payer: MEDICARE

## 2024-03-26 VITALS
RESPIRATION RATE: 15 BRPM | HEART RATE: 93 BPM | BODY MASS INDEX: 23.6 KG/M2 | SYSTOLIC BLOOD PRESSURE: 154 MMHG | HEIGHT: 61 IN | OXYGEN SATURATION: 99 % | TEMPERATURE: 97.8 F | WEIGHT: 125 LBS | DIASTOLIC BLOOD PRESSURE: 89 MMHG

## 2024-03-26 PROCEDURE — 99202 OFFICE O/P NEW SF 15 MIN: CPT

## 2024-03-26 RX ORDER — FLUTICASONE PROPIONATE 50 UG/1
50 SPRAY, METERED NASAL TWICE DAILY
Qty: 1 | Refills: 3 | Status: DISCONTINUED | COMMUNITY
Start: 2018-11-06 | End: 2024-03-26

## 2024-03-26 RX ORDER — TRIAMCINOLONE ACETONIDE 1 MG/G
0.1 CREAM TOPICAL TWICE DAILY
Qty: 1 | Refills: 3 | Status: DISCONTINUED | COMMUNITY
Start: 2018-11-06 | End: 2024-03-26

## 2024-03-26 RX ORDER — AMLODIPINE BESYLATE 2.5 MG/1
2.5 TABLET ORAL DAILY
Qty: 60 | Refills: 3 | Status: DISCONTINUED | COMMUNITY
Start: 2023-05-03 | End: 2024-03-26

## 2024-03-26 RX ORDER — IBUPROFEN 200 MG/1
TABLET, COATED ORAL
Refills: 0 | Status: ACTIVE | COMMUNITY

## 2024-03-26 RX ORDER — FLUTICASONE PROPIONATE 50 UG/1
50 SPRAY, METERED NASAL TWICE DAILY
Qty: 1 | Refills: 5 | Status: DISCONTINUED | COMMUNITY
Start: 2017-08-01 | End: 2024-03-26

## 2024-03-26 RX ORDER — UBIDECARENONE 200 MG
CAPSULE ORAL
Refills: 0 | Status: ACTIVE | COMMUNITY

## 2024-03-26 RX ORDER — PNV NO.95/FERROUS FUM/FOLIC AC 28MG-0.8MG
TABLET ORAL
Refills: 0 | Status: ACTIVE | COMMUNITY

## 2024-03-26 RX ORDER — VITAMIN K2 90 MCG
125 MCG CAPSULE ORAL
Refills: 0 | Status: ACTIVE | COMMUNITY

## 2024-03-26 RX ORDER — HYDROCORTISONE 25 MG/G
2.5 CREAM TOPICAL DAILY
Qty: 1 | Refills: 1 | Status: DISCONTINUED | COMMUNITY
Start: 2023-01-25 | End: 2024-03-26

## 2024-03-26 NOTE — PHYSICAL EXAM
[Normal Breath Sounds] : Normal breath sounds [Normal Heart Sounds] : normal heart sounds [No Rash or Lesion] : No rash or lesion [Normal Rate and Rhythm] : normal rate and rhythm [Alert] : alert [Oriented to Person] : oriented to person [Oriented to Time] : oriented to time [Oriented to Place] : oriented to place [Calm] : calm [de-identified] : soft, NT/ND, +BS [de-identified] : healthy female [de-identified] : JAK/+ROM [de-identified] : NC/AT [de-identified] : intact

## 2024-03-26 NOTE — REVIEW OF SYSTEMS
[As Noted in HPI] : as noted in HPI [Negative] : Psychiatric [FreeTextEntry3] : wears glasses [FreeTextEntry5] : MCKENZIE [de-identified] : pre-DM, hx hyperthyroid

## 2024-03-26 NOTE — HISTORY OF PRESENT ILLNESS
[FreeTextEntry1] : 73 year old female presents for appointment prior to surveillance colonoscopy. Last colonoscopy was performed in 2016 and revealed diverticulosis. Currently, she denies any current issues. +Daily BM.   Patient denies family history of colon/rectal cancer, IBD.

## 2024-03-26 NOTE — ASSESSMENT
[FreeTextEntry1] : 73-year-old female presents for screening colonoscopy -Risks and benefits of the procedure were reviewed -Bowel prep given and explained -All questions answered -Patient to proceed with colonoscopy as scheduled

## 2024-03-27 ENCOUNTER — NON-APPOINTMENT (OUTPATIENT)
Age: 74
End: 2024-03-27

## 2024-03-27 ENCOUNTER — APPOINTMENT (OUTPATIENT)
Dept: OPHTHALMOLOGY | Facility: CLINIC | Age: 74
End: 2024-03-27
Payer: MEDICARE

## 2024-03-27 PROCEDURE — 92133 CPTRZD OPH DX IMG PST SGM ON: CPT

## 2024-03-27 PROCEDURE — 92004 COMPRE OPH EXAM NEW PT 1/>: CPT

## 2024-04-18 ENCOUNTER — APPOINTMENT (OUTPATIENT)
Dept: COLORECTAL SURGERY | Facility: CLINIC | Age: 74
End: 2024-04-18
Payer: MEDICARE

## 2024-04-18 DIAGNOSIS — K57.30 DIVERTICULOSIS OF LARGE INTESTINE W/OUT PERFORATION OR ABSCESS W/OUT BLEEDING: ICD-10-CM

## 2024-04-18 DIAGNOSIS — K64.9 UNSPECIFIED HEMORRHOIDS: ICD-10-CM

## 2024-04-18 DIAGNOSIS — K63.5 POLYP OF COLON: ICD-10-CM

## 2024-04-18 PROCEDURE — 45380 COLONOSCOPY AND BIOPSY: CPT

## 2024-04-24 ENCOUNTER — NON-APPOINTMENT (OUTPATIENT)
Age: 74
End: 2024-04-24

## 2024-04-24 ENCOUNTER — APPOINTMENT (OUTPATIENT)
Dept: OPHTHALMOLOGY | Facility: CLINIC | Age: 74
End: 2024-04-24
Payer: SELF-PAY

## 2024-04-24 LAB — CORE LAB BIOPSY: NORMAL

## 2024-04-24 PROCEDURE — 92015 DETERMINE REFRACTIVE STATE: CPT | Mod: NC

## 2024-04-25 ENCOUNTER — APPOINTMENT (OUTPATIENT)
Dept: ORTHOPEDIC SURGERY | Facility: CLINIC | Age: 74
End: 2024-04-25
Payer: MEDICARE

## 2024-04-25 DIAGNOSIS — M77.51 OTHER ENTHESOPATHY OF RT FOOT AND ANKLE: ICD-10-CM

## 2024-04-25 DIAGNOSIS — M77.42 METATARSALGIA, LEFT FOOT: ICD-10-CM

## 2024-04-25 DIAGNOSIS — M77.41 METATARSALGIA, RIGHT FOOT: ICD-10-CM

## 2024-04-25 DIAGNOSIS — M77.52 OTHER ENTHESOPATHY OF LT FOOT AND ANKLE: ICD-10-CM

## 2024-04-25 PROCEDURE — 73630 X-RAY EXAM OF FOOT: CPT | Mod: 50

## 2024-04-25 PROCEDURE — 99203 OFFICE O/P NEW LOW 30 MIN: CPT

## 2024-04-25 NOTE — PHYSICAL EXAM
[Right] : right foot and ankle [3rd] : 3rd [Left] : left foot and ankle [5th] : 5th [NL (40)] : plantar flexion 40 degrees [NL 30)] : inversion 30 degrees [NL (20)] : eversion 20 degrees [5___] : eversion 5[unfilled]/5 [2+] : dorsalis pedis pulse: 2+ [] : Sensation present to light touch in all distributions

## 2024-04-25 NOTE — DATA REVIEWED
[MRI] : MRI [Right] : of the right [Foot] : foot [I independently reviewed and interpreted images and report] : I independently reviewed and interpreted images and report [I reviewed the films/CD and agree] : I reviewed the films/CD and agree [FreeTextEntry1] : 11/20/23: negative for stress fracture. 3rd metatarsal bursitis. interstitial split tear of the peroneus longus just prior to its insertion.

## 2024-04-25 NOTE — DISCUSSION/SUMMARY
[de-identified] : wbat in supportive shoes rx for custom orthotics with metatarsal pad f/u 8 weeks

## 2024-04-25 NOTE — IMAGING
[Bilateral] : foot bilaterally [Weight -] : weightbearing [de-identified] : There is elongation of the 3rd metatarsal. No fractures.

## 2024-04-25 NOTE — HISTORY OF PRESENT ILLNESS
[Sharp] : sharp [Shooting] : shooting [Stabbing] : stabbing [Walking] : walking [Stairs] : stairs [de-identified] : 04/25/2024: DARCY LUNDBERG a 73-year-old female here for evaluation of her both feet. Patient states she has been having a sharp pain on the bottom of the right foot since September but the last few weeks it has been getting worse. She a podiatrist in November had an MRI and was told she had buritis on the right foot. Also had CSI without relief. She has been doing PT for 8 weeks.  WB in sneakers,   [] : no [FreeTextEntry1] : b/l feet

## 2024-07-22 LAB
M TB IFN-G BLD-IMP: NEGATIVE
QUANTIFERON TB PLUS MITOGEN MINUS NIL: >10 IU/ML
QUANTIFERON TB PLUS NIL: 0.03 IU/ML
QUANTIFERON TB PLUS TB1 MINUS NIL: 0 IU/ML
QUANTIFERON TB PLUS TB2 MINUS NIL: 0.01 IU/ML

## 2024-08-23 ENCOUNTER — APPOINTMENT (OUTPATIENT)
Dept: INTERNAL MEDICINE | Facility: CLINIC | Age: 74
End: 2024-08-23
Payer: MEDICARE

## 2024-08-23 VITALS
HEIGHT: 61 IN | HEART RATE: 90 BPM | TEMPERATURE: 98.2 F | SYSTOLIC BLOOD PRESSURE: 148 MMHG | WEIGHT: 124 LBS | BODY MASS INDEX: 23.41 KG/M2 | OXYGEN SATURATION: 97 % | DIASTOLIC BLOOD PRESSURE: 82 MMHG

## 2024-08-23 VITALS — SYSTOLIC BLOOD PRESSURE: 132 MMHG | DIASTOLIC BLOOD PRESSURE: 90 MMHG

## 2024-08-23 DIAGNOSIS — Z86.39 PERSONAL HISTORY OF OTHER ENDOCRINE, NUTRITIONAL AND METABOLIC DISEASE: ICD-10-CM

## 2024-08-23 DIAGNOSIS — E78.5 HYPERLIPIDEMIA, UNSPECIFIED: ICD-10-CM

## 2024-08-23 DIAGNOSIS — N60.19 DIFFUSE CYSTIC MASTOPATHY OF UNSPECIFIED BREAST: ICD-10-CM

## 2024-08-23 DIAGNOSIS — Z00.00 ENCOUNTER FOR GENERAL ADULT MEDICAL EXAMINATION W/OUT ABNORMAL FINDINGS: ICD-10-CM

## 2024-08-23 DIAGNOSIS — M77.51 OTHER ENTHESOPATHY OF RT FOOT AND ANKLE: ICD-10-CM

## 2024-08-23 DIAGNOSIS — E03.9 HYPOTHYROIDISM, UNSPECIFIED: ICD-10-CM

## 2024-08-23 DIAGNOSIS — N95.9 UNSPECIFIED MENOPAUSAL AND PERIMENOPAUSAL DISORDER: ICD-10-CM

## 2024-08-23 PROCEDURE — 36415 COLL VENOUS BLD VENIPUNCTURE: CPT

## 2024-08-23 PROCEDURE — G0439: CPT

## 2024-08-23 RX ORDER — AMLODIPINE BESYLATE 2.5 MG/1
2.5 TABLET ORAL DAILY
Qty: 60 | Refills: 3 | Status: ACTIVE | COMMUNITY
Start: 2024-08-23 | End: 1900-01-01

## 2024-08-23 RX ORDER — ESTRADIOL 10 UG/1
10 TABLET, FILM COATED VAGINAL
Qty: 51 | Refills: 3 | Status: ACTIVE | COMMUNITY
Start: 2024-08-23 | End: 1900-01-01

## 2024-08-23 NOTE — HISTORY OF PRESENT ILLNESS
[de-identified] : 74 yo woman w HLD, fibrocyst breasts/biops, low D, o,penia, allergic rhinitis for annual checkup Doing well. Shingrix in '18 x2; other vax uptodate; tdap '14 now due Mammo /US due- h/o biopsy and hematoma right breast Dr. storm- ongoing discomfort x yrs in that breast Dexa 8/22 -1.6 -2.3 no meds due  [place] 4/24 Dr. Zepeda Colorectal, had hyperpl polyp  f/h Gastric ca. in father. Home BPs usually ~ 120s/70, occas up to 136/ then comes back down. Some salt in diet. Had seen Card Dr. Brian in '17 for abn ekg, w/u neg. She never started the Amlod 2.5, read about s.e. and also was having dizziness- saw Neuro and ENT nothing found. Also saw Neuro Dr. Schoenberg, for pinched n. in neck, did PT. She says she does take the statin. Ongoing issues past yr w foot pain- dx Bursitis- also ?tear on MRI- saw Podiatry then Ortho, had MRI, has done cortisone shots,  Medrol dose paks, PT and exercises, orthotics without relief. Pain in ball of foot beneath 4-5 th toes right foot. Advil does help but bothers GI. Interested in another opinion.  Menopausal sxs of vag dryness and dyspareunia

## 2024-08-23 NOTE — REASON FOR VISIT
Last seen 09.07.17    Last office note states to RTO 6 months/03.2018    Next appointment scheduled for none yet. [Annual Wellness Visit] : an annual wellness visit

## 2024-08-23 NOTE — PHYSICAL EXAM
[No Acute Distress] : no acute distress [Well Nourished] : well nourished [Well Developed] : well developed [Well-Appearing] : well-appearing [Normal Sclera/Conjunctiva] : normal sclera/conjunctiva [PERRL] : pupils equal round and reactive to light [EOMI] : extraocular movements intact [Normal Outer Ear/Nose] : the outer ears and nose were normal in appearance [Normal Oropharynx] : the oropharynx was normal [No JVD] : no jugular venous distention [No Lymphadenopathy] : no lymphadenopathy [Supple] : supple [Thyroid Normal, No Nodules] : the thyroid was normal and there were no nodules present [No Respiratory Distress] : no respiratory distress  [No Accessory Muscle Use] : no accessory muscle use [Clear to Auscultation] : lungs were clear to auscultation bilaterally [Normal Rate] : normal rate  [Regular Rhythm] : with a regular rhythm [Normal S1, S2] : normal S1 and S2 [No Murmur] : no murmur heard [No Carotid Bruits] : no carotid bruits [No Abdominal Bruit] : a ~M bruit was not heard ~T in the abdomen [No Varicosities] : no varicosities [Pedal Pulses Present] : the pedal pulses are present [No Edema] : there was no peripheral edema [No Palpable Aorta] : no palpable aorta [No Extremity Clubbing/Cyanosis] : no extremity clubbing/cyanosis [Normal Appearance] : normal in appearance [No Axillary Lymphadenopathy] : no axillary lymphadenopathy [Soft] : abdomen soft [Non Tender] : non-tender [Non-distended] : non-distended [No Masses] : no abdominal mass palpated [No HSM] : no HSM [Normal Bowel Sounds] : normal bowel sounds [Normal Posterior Cervical Nodes] : no posterior cervical lymphadenopathy [Normal Anterior Cervical Nodes] : no anterior cervical lymphadenopathy [No CVA Tenderness] : no CVA  tenderness [No Spinal Tenderness] : no spinal tenderness [No Joint Swelling] : no joint swelling [Grossly Normal Strength/Tone] : grossly normal strength/tone [No Rash] : no rash [Coordination Grossly Intact] : coordination grossly intact [No Focal Deficits] : no focal deficits [Normal Gait] : normal gait [Deep Tendon Reflexes (DTR)] : deep tendon reflexes were 2+ and symmetric [Normal Affect] : the affect was normal [Normal Insight/Judgement] : insight and judgment were intact [de-identified] : scar right reast s/p biopsy

## 2024-08-23 NOTE — HEALTH RISK ASSESSMENT
[Good] : ~his/her~ current health as good [No] : No [No falls in past year] : Patient reported no falls in the past year [0] : 2) Feeling down, depressed, or hopeless: Not at all (0) [PHQ-2 Negative - No further assessment needed] : PHQ-2 Negative - No further assessment needed [Never] : Never [None] : None [With Significant Other] : lives with significant other [Retired] : retired [] :  [# Of Children ___] : has [unfilled] children [Sexually Active] : sexually active [Feels Safe at Home] : Feels safe at home [Fully functional (bathing, dressing, toileting, transferring, walking, feeding)] : Fully functional (bathing, dressing, toileting, transferring, walking, feeding) [Fully functional (using the telephone, shopping, preparing meals, housekeeping, doing laundry, using] : Fully functional and needs no help or supervision to perform IADLs (using the telephone, shopping, preparing meals, housekeeping, doing laundry, using transportation, managing medications and managing finances) [Smoke Detector] : smoke detector [Carbon Monoxide Detector] : carbon monoxide detector [Safety elements used in home] : safety elements used in home [Seat Belt] :  uses seat belt [de-identified] : no-limited by foot pain [de-identified] : healthy [[id number]] : 0 [Change in mental status noted] : No change in mental status noted [Language] : denies difficulty with language [High Risk Behavior] : no high risk behavior [Reports changes in hearing] : Reports no changes in hearing [Reports changes in vision] : Reports no changes in vision [Reports changes in dental health] : Reports no changes in dental health [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

## 2024-08-29 LAB
25(OH)D3 SERPL-MCNC: 37.2 NG/ML
ALBUMIN SERPL ELPH-MCNC: 4.9 G/DL
ALP BLD-CCNC: 109 U/L
ALT SERPL-CCNC: 35 U/L
ANION GAP SERPL CALC-SCNC: 12 MMOL/L
APO LP(A) SERPL-MCNC: <9 NMOL/L
AST SERPL-CCNC: 25 U/L
BILIRUB SERPL-MCNC: 0.5 MG/DL
BUN SERPL-MCNC: 15 MG/DL
CALCIUM SERPL-MCNC: 10.2 MG/DL
CHLORIDE SERPL-SCNC: 105 MMOL/L
CHOLEST SERPL-MCNC: 167 MG/DL
CO2 SERPL-SCNC: 24 MMOL/L
CREAT SERPL-MCNC: 0.62 MG/DL
CREAT SPEC-SCNC: 76 MG/DL
EGFR: 93 ML/MIN/1.73M2
ESTIMATED AVERAGE GLUCOSE: 131 MG/DL
GLUCOSE SERPL-MCNC: 105 MG/DL
HBA1C MFR BLD HPLC: 6.2 %
HCT VFR BLD CALC: 44.3 %
HDLC SERPL-MCNC: 46 MG/DL
HGB BLD-MCNC: 14.6 G/DL
LDLC SERPL CALC-MCNC: 98 MG/DL
MCHC RBC-ENTMCNC: 29.7 PG
MCHC RBC-ENTMCNC: 33 GM/DL
MCV RBC AUTO: 90 FL
MICROALBUMIN 24H UR DL<=1MG/L-MCNC: 2.8 MG/DL
MICROALBUMIN/CREAT 24H UR-RTO: 36 MG/G
NONHDLC SERPL-MCNC: 121 MG/DL
PLATELET # BLD AUTO: 260 K/UL
POTASSIUM SERPL-SCNC: 4 MMOL/L
PROT SERPL-MCNC: 7.7 G/DL
RBC # BLD: 4.92 M/UL
RBC # FLD: 14.5 %
SODIUM SERPL-SCNC: 141 MMOL/L
TRIGL SERPL-MCNC: 129 MG/DL
TSH SERPL-ACNC: 4.65 UIU/ML
WBC # FLD AUTO: 6.49 K/UL

## 2024-09-19 ENCOUNTER — APPOINTMENT (OUTPATIENT)
Dept: ORTHOPEDIC SURGERY | Facility: CLINIC | Age: 74
End: 2024-09-19
Payer: MEDICARE

## 2024-09-19 VITALS — WEIGHT: 125 LBS | HEIGHT: 61 IN | BODY MASS INDEX: 23.6 KG/M2

## 2024-09-19 DIAGNOSIS — M21.6X1 OTHER ACQUIRED DEFORMITIES OF RIGHT FOOT: ICD-10-CM

## 2024-09-19 DIAGNOSIS — M77.51 OTHER ENTHESOPATHY OF RT FOOT AND ANKLE: ICD-10-CM

## 2024-09-19 PROCEDURE — 99204 OFFICE O/P NEW MOD 45 MIN: CPT

## 2024-09-20 RX ORDER — DICLOFENAC SODIUM 10 MG/G
1 GEL TOPICAL DAILY
Qty: 3 | Refills: 0 | Status: ACTIVE | COMMUNITY
Start: 2024-09-20 | End: 1900-01-01

## 2024-09-20 NOTE — PHYSICAL EXAM
[de-identified] : Extremity: +cavus (subtle) R foot, mild residual tenderness plantar aspect second and third metatarsal heads region R forefoot.  Nontender R ankle, peroneals, syndesmosis, Achilles, hindfoot ST, midfoot LF and PTT insertional, and remainder of forefoot.  Calves soft and nontender, sensorimotor unchanged, skin intact R LE.  AOx3, mood / affect normal. [de-identified] : Radiographs (4/25/24) reveal increased calcaneal inclination B hindfoot, Ashraf's B feet, bipartite lateral sesamoid R forefoot.  MRI from 11/20/23 - Impression  1. Negative stress fracture.  Trace third webspace intermetatarsal bursitis.  2.  There is interstitial split tear of the peroneus longus just prior to its insertion at the base of the first metatarsal.  There is a moderate tenosynovitis of the peroneus longus from the level of the undersurface of the cuboid to its terminal insertion.

## 2024-09-20 NOTE — HISTORY OF PRESENT ILLNESS
[FreeTextEntry1] : Ms. DARCY LUNDBERG  is a 73 year old female who presents with 1 year of right foot pain on the plantar aspect under her 3rd metatarsal head, here for third opinion.  She has pain when she ambulates.  She was treated by a podiatrist (Heath Barfield DPM) and had 2 injections and did PT without any relief.  She felt the pain was improving last month but then the pain returned just as bad as when it started.  She ambulates in regular sneakers.  Saw Dr. Solorio in April 2024.

## 2024-09-20 NOTE — DISCUSSION/SUMMARY
[de-identified] : Discussed with patient nature of condition, potential course / sequelae, options reviewed.  [place] shoe wear / wide toe box and RBS, full-length custom viscoelastic orthotics, activity modification, HEP.  Return to office PRN, third opinion protocol.  All questions answered.

## 2024-09-20 NOTE — PHYSICAL EXAM
[de-identified] : Extremity: +cavus (subtle) R foot, mild residual tenderness plantar aspect second and third metatarsal heads region R forefoot.  Nontender R ankle, peroneals, syndesmosis, Achilles, hindfoot ST, midfoot LF and PTT insertional, and remainder of forefoot.  Calves soft and nontender, sensorimotor unchanged, skin intact R LE.  AOx3, mood / affect normal. [de-identified] : Radiographs (4/25/24) reveal increased calcaneal inclination B hindfoot, Ashraf's B feet, bipartite lateral sesamoid R forefoot.  MRI from 11/20/23 - Impression  1. Negative stress fracture.  Trace third webspace intermetatarsal bursitis.  2.  There is interstitial split tear of the peroneus longus just prior to its insertion at the base of the first metatarsal.  There is a moderate tenosynovitis of the peroneus longus from the level of the undersurface of the cuboid to its terminal insertion.

## 2024-09-20 NOTE — DISCUSSION/SUMMARY
[de-identified] : Discussed with patient nature of condition, potential course / sequelae, options reviewed.  [place] shoe wear / wide toe box and RBS, full-length custom viscoelastic orthotics, activity modification, HEP.  Return to office PRN, third opinion protocol.  All questions answered.

## 2024-10-25 ENCOUNTER — OUTPATIENT (OUTPATIENT)
Dept: OUTPATIENT SERVICES | Facility: HOSPITAL | Age: 74
LOS: 1 days | End: 2024-10-25
Payer: MEDICARE

## 2024-10-25 ENCOUNTER — RESULT REVIEW (OUTPATIENT)
Age: 74
End: 2024-10-25

## 2024-10-25 ENCOUNTER — APPOINTMENT (OUTPATIENT)
Dept: MAMMOGRAPHY | Facility: CLINIC | Age: 74
End: 2024-10-25
Payer: MEDICARE

## 2024-10-25 ENCOUNTER — APPOINTMENT (OUTPATIENT)
Dept: ULTRASOUND IMAGING | Facility: CLINIC | Age: 74
End: 2024-10-25
Payer: MEDICARE

## 2024-10-25 ENCOUNTER — APPOINTMENT (OUTPATIENT)
Dept: RADIOLOGY | Facility: CLINIC | Age: 74
End: 2024-10-25
Payer: MEDICARE

## 2024-10-25 DIAGNOSIS — R92.8 OTHER ABNORMAL AND INCONCLUSIVE FINDINGS ON DIAGNOSTIC IMAGING OF BREAST: ICD-10-CM

## 2024-10-25 PROCEDURE — 77080 DXA BONE DENSITY AXIAL: CPT | Mod: 26

## 2024-10-25 PROCEDURE — 77067 SCR MAMMO BI INCL CAD: CPT | Mod: 26

## 2024-10-25 PROCEDURE — 76641 ULTRASOUND BREAST COMPLETE: CPT | Mod: 26,50

## 2024-10-25 PROCEDURE — 77067 SCR MAMMO BI INCL CAD: CPT

## 2024-10-25 PROCEDURE — 77080 DXA BONE DENSITY AXIAL: CPT

## 2024-10-25 PROCEDURE — 77063 BREAST TOMOSYNTHESIS BI: CPT | Mod: 26

## 2024-10-25 PROCEDURE — 77063 BREAST TOMOSYNTHESIS BI: CPT

## 2024-10-25 PROCEDURE — 76641 ULTRASOUND BREAST COMPLETE: CPT

## 2025-09-02 ENCOUNTER — RX RENEWAL (OUTPATIENT)
Age: 75
End: 2025-09-02

## 2025-09-12 ENCOUNTER — APPOINTMENT (OUTPATIENT)
Dept: INTERNAL MEDICINE | Facility: CLINIC | Age: 75
End: 2025-09-12
Payer: MEDICARE

## 2025-09-12 VITALS
HEIGHT: 61 IN | BODY MASS INDEX: 22.28 KG/M2 | TEMPERATURE: 98.1 F | WEIGHT: 118 LBS | DIASTOLIC BLOOD PRESSURE: 83 MMHG | OXYGEN SATURATION: 97 % | HEART RATE: 91 BPM | SYSTOLIC BLOOD PRESSURE: 148 MMHG

## 2025-09-12 VITALS — DIASTOLIC BLOOD PRESSURE: 84 MMHG | SYSTOLIC BLOOD PRESSURE: 142 MMHG

## 2025-09-12 DIAGNOSIS — R92.8 OTHER ABNORMAL AND INCONCLUSIVE FINDINGS ON DIAGNOSTIC IMAGING OF BREAST: ICD-10-CM

## 2025-09-12 DIAGNOSIS — M81.0 AGE-RELATED OSTEOPOROSIS W/OUT CURRENT PATHOLOGICAL FRACTURE: ICD-10-CM

## 2025-09-12 DIAGNOSIS — Z23 ENCOUNTER FOR IMMUNIZATION: ICD-10-CM

## 2025-09-12 DIAGNOSIS — Z00.00 ENCOUNTER FOR GENERAL ADULT MEDICAL EXAMINATION W/OUT ABNORMAL FINDINGS: ICD-10-CM

## 2025-09-12 DIAGNOSIS — R80.9 PROTEINURIA, UNSPECIFIED: ICD-10-CM

## 2025-09-12 DIAGNOSIS — E03.9 HYPOTHYROIDISM, UNSPECIFIED: ICD-10-CM

## 2025-09-12 DIAGNOSIS — E78.5 HYPERLIPIDEMIA, UNSPECIFIED: ICD-10-CM

## 2025-09-12 DIAGNOSIS — I10 ESSENTIAL (PRIMARY) HYPERTENSION: ICD-10-CM

## 2025-09-12 PROCEDURE — 90662 IIV NO PRSV INCREASED AG IM: CPT

## 2025-09-12 PROCEDURE — 90472 IMMUNIZATION ADMIN EACH ADD: CPT

## 2025-09-12 PROCEDURE — 90677 PCV20 VACCINE IM: CPT

## 2025-09-12 PROCEDURE — G0009: CPT

## 2025-09-12 PROCEDURE — G0439: CPT

## 2025-09-12 PROCEDURE — 36415 COLL VENOUS BLD VENIPUNCTURE: CPT

## 2025-09-15 LAB
25(OH)D3 SERPL-MCNC: 37.7 NG/ML
ALBUMIN SERPL ELPH-MCNC: 5 G/DL
ALBUMIN, RANDOM URINE: 1.7 MG/DL
ALP BLD-CCNC: 85 U/L
ALT SERPL-CCNC: 29 U/L
ANION GAP SERPL CALC-SCNC: 15 MMOL/L
AST SERPL-CCNC: 20 U/L
BILIRUB SERPL-MCNC: 0.7 MG/DL
BUN SERPL-MCNC: 17 MG/DL
CALCIUM SERPL-MCNC: 10.1 MG/DL
CHLORIDE SERPL-SCNC: 106 MMOL/L
CHOLEST SERPL-MCNC: 191 MG/DL
CO2 SERPL-SCNC: 24 MMOL/L
CREAT SERPL-MCNC: 0.67 MG/DL
CREAT SPEC-SCNC: 88 MG/DL
EGFRCR SERPLBLD CKD-EPI 2021: 91 ML/MIN/1.73M2
ESTIMATED AVERAGE GLUCOSE: 131 MG/DL
GLUCOSE SERPL-MCNC: 107 MG/DL
HBA1C MFR BLD HPLC: 6.2 %
HCT VFR BLD CALC: 44.1 %
HDLC SERPL-MCNC: 56 MG/DL
HGB BLD-MCNC: 14.2 G/DL
LDLC SERPL-MCNC: 115 MG/DL
MCHC RBC-ENTMCNC: 29 PG
MCHC RBC-ENTMCNC: 32.2 G/DL
MCV RBC AUTO: 90 FL
MICROALBUMIN/CREAT 24H UR-RTO: 20 MG/G
NONHDLC SERPL-MCNC: 135 MG/DL
PLATELET # BLD AUTO: 245 K/UL
POTASSIUM SERPL-SCNC: 4.3 MMOL/L
PROT SERPL-MCNC: 8 G/DL
RBC # BLD: 4.9 M/UL
RBC # FLD: 14.4 %
SODIUM SERPL-SCNC: 144 MMOL/L
TRIGL SERPL-MCNC: 107 MG/DL
TSH SERPL-ACNC: 4.37 UIU/ML
WBC # FLD AUTO: 6.69 K/UL